# Patient Record
Sex: MALE | Race: WHITE | Employment: FULL TIME | URBAN - METROPOLITAN AREA
[De-identification: names, ages, dates, MRNs, and addresses within clinical notes are randomized per-mention and may not be internally consistent; named-entity substitution may affect disease eponyms.]

---

## 2020-08-26 ENCOUNTER — HOSPITAL ENCOUNTER (OUTPATIENT)
Dept: MRI IMAGING | Age: 50
Discharge: HOME | End: 2020-08-28
Payer: COMMERCIAL

## 2020-08-26 PROCEDURE — 73721 MRI JNT OF LWR EXTRE W/O DYE: CPT

## 2021-07-22 ENCOUNTER — APPOINTMENT (OUTPATIENT)
Dept: GENERAL RADIOLOGY | Age: 51
End: 2021-07-22
Payer: COMMERCIAL

## 2021-07-22 ENCOUNTER — HOSPITAL ENCOUNTER (EMERGENCY)
Age: 51
Discharge: HOME | End: 2021-07-22
Payer: COMMERCIAL

## 2021-07-22 VITALS
HEART RATE: 84 BPM | TEMPERATURE: 98.1 F | HEIGHT: 71 IN | WEIGHT: 285 LBS | SYSTOLIC BLOOD PRESSURE: 129 MMHG | DIASTOLIC BLOOD PRESSURE: 84 MMHG | RESPIRATION RATE: 18 BRPM | OXYGEN SATURATION: 96 % | BODY MASS INDEX: 39.9 KG/M2

## 2021-07-22 DIAGNOSIS — S93.601A SPRAIN OF RIGHT FOOT, INITIAL ENCOUNTER: Primary | ICD-10-CM

## 2021-07-22 PROCEDURE — 99283 EMERGENCY DEPT VISIT LOW MDM: CPT

## 2021-07-22 PROCEDURE — 73630 X-RAY EXAM OF FOOT: CPT

## 2021-07-22 ASSESSMENT — ENCOUNTER SYMPTOMS
BACK PAIN: 0
SHORTNESS OF BREATH: 0
ABDOMINAL PAIN: 0
COUGH: 0

## 2021-07-22 ASSESSMENT — PAIN DESCRIPTION - LOCATION: LOCATION: FOOT

## 2021-07-22 ASSESSMENT — PAIN SCALES - GENERAL: PAINLEVEL_OUTOF10: 6

## 2021-07-22 ASSESSMENT — PAIN DESCRIPTION - ORIENTATION: ORIENTATION: RIGHT

## 2021-07-22 ASSESSMENT — PAIN DESCRIPTION - FREQUENCY: FREQUENCY: CONTINUOUS

## 2021-07-22 ASSESSMENT — PAIN DESCRIPTION - PAIN TYPE: TYPE: ACUTE PAIN

## 2021-07-22 ASSESSMENT — PAIN DESCRIPTION - DESCRIPTORS: DESCRIPTORS: ACHING;SHARP

## 2021-07-22 NOTE — ED PROVIDER NOTES
[address] [facility] ED  eMERGENCY dEPARTMENT eNCOUnter      Pt Name: Kathy Mendoza  MRN: [id number]  [place] 1970  Date of evaluation: 7/22/2021  Provider: LEONARD Rodrigues CNP      HISTORY OF PRESENT ILLNESS    Kathy Mendoza is a 45 y.o. male who presents to the Emergency Department with R foot pain after stepping off the curb and inverting his foot. Pain is moderate. He is able to ambulate without difficulty. REVIEW OF SYSTEMS       Review of Systems   Constitutional: Negative for fever. HENT: Negative for congestion. Respiratory: Negative for cough and shortness of breath. Cardiovascular: Negative for chest pain. Gastrointestinal: Negative for abdominal pain. Genitourinary: Negative for dysuria. Musculoskeletal: Negative for arthralgias and back pain. R foot pain   Skin: Negative for rash. All other systems reviewed and are negative. PAST MEDICAL HISTORY     Past Medical History:   Diagnosis Date    Hypertension          SURGICAL HISTORY       Past Surgical History:   Procedure Laterality Date    FOOT SURGERY Left     SHOULDER SURGERY      left    VASECTOMY           CURRENT MEDICATIONS       Previous Medications    No medications on file       ALLERGIES     Corticosteroids    FAMILY HISTORY     History reviewed. No pertinent family history.        SOCIAL HISTORY       Social History     Socioeconomic History    Marital status:      Spouse name: None    Number of children: None    Years of education: None    Highest education level: None   Occupational History    None   Tobacco Use    Smoking status: Former Smoker    Smokeless tobacco: Never Used   Substance and Sexual Activity    Alcohol use: Not Currently    Drug use: Never    Sexual activity: None   Other Topics Concern    None   Social History Narrative    None     Social Determinants of Health     Financial Resource Strain:     Difficulty of Paying Living Expenses: Food Insecurity:     Worried About Running Out of Food in the Last Year:     [address] in the Last Year:    Transportation Needs:     Lack of Transportation (Medical):  Lack of Transportation (Non-Medical):    Physical Activity:     Days of Exercise per Week:     Minutes of Exercise per Session:    Stress:     Feeling of Stress :    Social Connections:     Frequency of Communication with Friends and Family:     Frequency of Social Gatherings with Friends and Family:     Attends [employer] Services:     Active Member of Clubs or Organizations:     Attends Club or Organization Meetings:     Marital Status:    Intimate Partner Violence:     Fear of Current or Ex-Partner:     Emotionally Abused:     Physically Abused:     Sexually Abused:        SCREENINGS      @FLOW([id number])@      PHYSICAL EXAM    (up to 7 for level 4, 8 or more for level 5)     ED Triage Vitals [07/22/21 1513]   BP Temp Temp src Pulse Resp SpO2 Height Weight   129/84 98.1 °F (36.7 °C) -- 84 18 96 % 5' 11\" (1.803 m) 285 lb (129.3 kg)       Physical Exam  Vitals and nursing note reviewed. Constitutional:       Appearance: He is well-developed. HENT:      Head: Normocephalic and atraumatic. Right Ear: External ear normal.      Left Ear: External ear normal.   Eyes:      Conjunctiva/sclera: Conjunctivae normal.      Pupils: Pupils are equal, round, and reactive to light. Cardiovascular:      Rate and Rhythm: Normal rate and regular rhythm. Pulmonary:      Effort: Pulmonary effort is normal.      Breath sounds: Normal breath sounds. Abdominal:      General: Bowel sounds are normal. There is no distension. Palpations: Abdomen is soft. Tenderness: There is no abdominal tenderness. Musculoskeletal:         General: Normal range of motion. Cervical back: Normal range of motion and neck supple. Right foot: Tenderness present. No swelling or deformity.         Feet:    Skin:     General: Skin is warm and dry. Neurological:      Mental Status: He is alert and oriented to person, place, and time. Deep Tendon Reflexes: Reflexes are normal and symmetric. Psychiatric:         Judgment: Judgment normal.           All other labs were within normal range or not returned as of this dictation. EMERGENCY DEPARTMENT COURSE and DIFFERENTIALDIAGNOSIS/MDM:   Vitals:    Vitals:    07/22/21 1513   BP: 129/84   Pulse: 84   Resp: 18   Temp: 98.1 °F (36.7 °C)   SpO2: 96%   Weight: 285 lb (129.3 kg)   Height: 5' 11\" (1.803 m)            45 yr old male with R foot sprain. Xray was negative. F/U with PCP in 1 week if symptoms persist.  Patient verbalizes understanding. PROCEDURES:  Unless otherwise noted below, none     Procedures      FINAL IMPRESSION      1.  Sprain of right foot, initial encounter          DISPOSITION/PLAN   DISPOSITION Decision To Discharge 07/22/2021 04:05:36 PM          LEONARD Hancock CNP (electronically signed)  Attending Emergency Physician     LEONARD Hancock CNP  07/22/21 3648

## 2021-07-22 NOTE — ED TRIAGE NOTES
Pt c/o rt side of foot pain d/t rolling the foot about hour ago. 0 deformity noted, 0 open wound. Pt to er ambulatory, a&ox4, skin w/d/pink, pulses palp, msp's intact, 0 other c/o.

## 2022-12-17 ENCOUNTER — HOSPITAL ENCOUNTER (EMERGENCY)
Age: 52
Discharge: HOME | End: 2022-12-17
Payer: COMMERCIAL

## 2022-12-17 ENCOUNTER — APPOINTMENT (OUTPATIENT)
Dept: CT IMAGING | Age: 52
End: 2022-12-17
Payer: COMMERCIAL

## 2022-12-17 VITALS
HEART RATE: 79 BPM | RESPIRATION RATE: 18 BRPM | OXYGEN SATURATION: 95 % | SYSTOLIC BLOOD PRESSURE: 148 MMHG | DIASTOLIC BLOOD PRESSURE: 91 MMHG | TEMPERATURE: 98.7 F

## 2022-12-17 DIAGNOSIS — R73.09 ELEVATED GLUCOSE: ICD-10-CM

## 2022-12-17 DIAGNOSIS — H61.22 IMPACTED CERUMEN OF LEFT EAR: ICD-10-CM

## 2022-12-17 DIAGNOSIS — H81.10 BENIGN PAROXYSMAL POSITIONAL VERTIGO, UNSPECIFIED LATERALITY: Primary | ICD-10-CM

## 2022-12-17 LAB
ALBUMIN SERPL-MCNC: 4.4 G/DL (ref 3.5–4.6)
ALP BLD-CCNC: 87 U/L (ref 35–104)
ALT SERPL-CCNC: 44 U/L (ref 0–41)
ANION GAP SERPL CALCULATED.3IONS-SCNC: 10 MEQ/L (ref 9–15)
APTT: 29 SEC (ref 24.4–36.8)
AST SERPL-CCNC: 23 U/L (ref 0–40)
BASOPHILS ABSOLUTE: 0 K/UL (ref 0–0.2)
BASOPHILS RELATIVE PERCENT: 0.4 %
BILIRUB SERPL-MCNC: 0.3 MG/DL (ref 0.2–0.7)
BILIRUBIN URINE: NEGATIVE
BLOOD, URINE: NEGATIVE
BUN BLDV-MCNC: 14 MG/DL (ref 6–20)
CALCIUM SERPL-MCNC: 9.1 MG/DL (ref 8.5–9.9)
CHLORIDE BLD-SCNC: 102 MEQ/L (ref 95–107)
CLARITY: CLEAR
CO2: 25 MEQ/L (ref 20–31)
COLOR: YELLOW
CREAT SERPL-MCNC: 0.66 MG/DL (ref 0.7–1.2)
EOSINOPHILS ABSOLUTE: 0.1 K/UL (ref 0–0.7)
EOSINOPHILS RELATIVE PERCENT: 1.4 %
GFR SERPL CREATININE-BSD FRML MDRD: >60 ML/MIN/{1.73_M2}
GLOBULIN: 2.3 G/DL (ref 2.3–3.5)
GLUCOSE BLD-MCNC: 132 MG/DL (ref 70–99)
GLUCOSE BLD-MCNC: 151 MG/DL (ref 70–99)
GLUCOSE URINE: NEGATIVE MG/DL
HCT VFR BLD CALC: 41.3 % (ref 42–52)
HEMOGLOBIN: 14.2 G/DL (ref 14–18)
INR BLD: 1
KETONES, URINE: NEGATIVE MG/DL
LEUKOCYTE ESTERASE, URINE: NEGATIVE
LYMPHOCYTES ABSOLUTE: 1.5 K/UL (ref 1–4.8)
LYMPHOCYTES RELATIVE PERCENT: 17 %
MAGNESIUM: 1.7 MG/DL (ref 1.7–2.4)
MCH RBC QN AUTO: 31.8 PG (ref 27–31.3)
MCHC RBC AUTO-ENTMCNC: 34.4 % (ref 33–37)
MCV RBC AUTO: 92.5 FL (ref 79–92.2)
MONOCYTES ABSOLUTE: 0.3 K/UL (ref 0.2–0.8)
MONOCYTES RELATIVE PERCENT: 4 %
NEUTROPHILS ABSOLUTE: 6.7 K/UL (ref 1.4–6.5)
NEUTROPHILS RELATIVE PERCENT: 77.2 %
NITRITE, URINE: NEGATIVE
PDW BLD-RTO: 12.8 % (ref 11.5–14.5)
PERFORMED ON: ABNORMAL
PH UA: 5 (ref 5–9)
PLATELET # BLD: 191 K/UL (ref 130–400)
POTASSIUM SERPL-SCNC: 4.3 MEQ/L (ref 3.4–4.9)
PROTEIN UA: NEGATIVE MG/DL
PROTHROMBIN TIME: 13 SEC (ref 12.3–14.9)
RBC # BLD: 4.46 M/UL (ref 4.7–6.1)
SODIUM BLD-SCNC: 137 MEQ/L (ref 135–144)
SPECIFIC GRAVITY UA: 1.02 (ref 1–1.03)
TOTAL PROTEIN: 6.7 G/DL (ref 6.3–8)
TROPONIN: <0.01 NG/ML (ref 0–0.01)
TSH SERPL DL<=0.05 MIU/L-ACNC: 0.97 UIU/ML (ref 0.44–3.86)
UROBILINOGEN, URINE: 0.2 E.U./DL
WBC # BLD: 8.7 K/UL (ref 4.8–10.8)

## 2022-12-17 PROCEDURE — 85610 PROTHROMBIN TIME: CPT

## 2022-12-17 PROCEDURE — 84443 ASSAY THYROID STIM HORMONE: CPT

## 2022-12-17 PROCEDURE — 70450 CT HEAD/BRAIN W/O DYE: CPT

## 2022-12-17 PROCEDURE — 36415 COLL VENOUS BLD VENIPUNCTURE: CPT

## 2022-12-17 PROCEDURE — 93005 ELECTROCARDIOGRAM TRACING: CPT | Performed by: PHYSICIAN ASSISTANT

## 2022-12-17 PROCEDURE — 6370000000 HC RX 637 (ALT 250 FOR IP): Performed by: PHYSICIAN ASSISTANT

## 2022-12-17 PROCEDURE — 85025 COMPLETE CBC W/AUTO DIFF WBC: CPT

## 2022-12-17 PROCEDURE — 80053 COMPREHEN METABOLIC PANEL: CPT

## 2022-12-17 PROCEDURE — 99284 EMERGENCY DEPT VISIT MOD MDM: CPT

## 2022-12-17 PROCEDURE — 81003 URINALYSIS AUTO W/O SCOPE: CPT

## 2022-12-17 PROCEDURE — 84484 ASSAY OF TROPONIN QUANT: CPT

## 2022-12-17 PROCEDURE — 85730 THROMBOPLASTIN TIME PARTIAL: CPT

## 2022-12-17 PROCEDURE — 83735 ASSAY OF MAGNESIUM: CPT

## 2022-12-17 RX ORDER — MECLIZINE HYDROCHLORIDE 25 MG/1
25 TABLET ORAL 3 TIMES DAILY PRN
Qty: 20 TABLET | Refills: 0 | Status: SHIPPED | OUTPATIENT
Start: 2022-12-17 | End: 2022-12-27

## 2022-12-17 RX ORDER — MECLIZINE HYDROCHLORIDE 25 MG/1
25 TABLET ORAL ONCE
Status: COMPLETED | OUTPATIENT
Start: 2022-12-17 | End: 2022-12-17

## 2022-12-17 RX ADMIN — MECLIZINE HYDROCHLORIDE 25 MG: 25 TABLET ORAL at 15:28

## 2022-12-17 ASSESSMENT — ENCOUNTER SYMPTOMS
COUGH: 0
APNEA: 0
PHOTOPHOBIA: 0
ABDOMINAL DISTENTION: 0
VOMITING: 0
VOICE CHANGE: 0
EYE DISCHARGE: 0
NAUSEA: 0
ANAL BLEEDING: 0

## 2022-12-17 ASSESSMENT — PAIN - FUNCTIONAL ASSESSMENT: PAIN_FUNCTIONAL_ASSESSMENT: NONE - DENIES PAIN

## 2022-12-17 NOTE — ED TRIAGE NOTES
To ED after a sudden spell of dizziness and diaphoresis. States he was feeling fine, working when he suddenly became dizzy and sweaty.

## 2022-12-17 NOTE — ED PROVIDER NOTES
[address] [facility] ED  eMERGENCY dEPARTMENT eNCOUnter      Pt Name: Guillermo Dunn  MRN: [id number]  [place] 1970  Date of evaluation: 12/17/2022  Provider: Saumya Castillo PA-C    CHIEF COMPLAINT       Chief Complaint   Patient presents with    Dizziness         HISTORY OF PRESENT ILLNESS   (Location/Symptom, Timing/Onset,Context/Setting, Quality, Duration, Modifying Factors, Severity)  Note limiting factors. Guillermo Dunn is a 47 y.o. 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 Has not had similar symptoms in the past.  He denies headache nausea vomiting chest pain blurred vision weakness denies a abdominal pain urinary symptom clinic pain burning frequent urination urinary bleeding rectal bleeding hematemesis. Motion worsens symptoms not moving improves his symptoms    HPI    NursingNotes were reviewed. REVIEW OF SYSTEMS    (2-9 systems for level 4, 10 or more for level 5)     Review of Systems   Constitutional:  Negative for activity change, appetite change and unexpected weight change. HENT:  Positive for ear pain and hearing loss. Negative for ear discharge, nosebleeds and voice change. Eyes:  Negative for photophobia and discharge. Respiratory:  Negative for apnea and cough. Cardiovascular:  Negative for chest pain. Gastrointestinal:  Negative for abdominal distention, anal bleeding, nausea and vomiting. Genitourinary:  Negative for dysuria and hematuria. Musculoskeletal:  Negative for joint swelling. Skin:  Negative for pallor. Neurological:  Positive for dizziness. Negative for seizures, syncope, facial asymmetry, weakness and headaches. Hematological:  Does not bruise/bleed easily.    Psychiatric/Behavioral: Negative for self-injury. All other systems reviewed and are negative. Except as noted above the remainder of the review of systems was reviewed and negative. PAST MEDICAL HISTORY     Past Medical History:   Diagnosis Date    Hypertension          SURGICALHISTORY       Past Surgical History:   Procedure Laterality Date    FOOT SURGERY Left     SHOULDER SURGERY      left    VASECTOMY           CURRENT MEDICATIONS       Previous Medications    No medications on file            Corticosteroids    FAMILY HISTORY     History reviewed. No pertinent family history. SOCIAL HISTORY       Social History     Socioeconomic History    Marital status:      Spouse name: None    Number of children: None    Years of education: None    Highest education level: None   Tobacco Use    Smoking status: Former    Smokeless tobacco: Never   Substance and Sexual Activity    Alcohol use: Not Currently    Drug use: Never       SCREENINGS   NIHSS Stroke Scale  Interval: Baseline  Level of Consciousness (1a): Alert  LOC Questions (1b): Answers both correctly  LOC Commands (1c): Performs both tasks correctly  Best Gaze (2): Normal  Visual (3): No visual loss  Facial Palsy (4): Normal symmetrical movement  Motor Arm, Left (5a): No drift  Motor Arm, Right (5b): No drift  Motor Leg, Left (6a): No drift  Motor Leg, Right (6b):  No drift  Limb Ataxia (7): Absent  Sensory (8): Normal  Best Language (9): No aphasia  Dysarthria (10): Normal  Extinction and Inattention (11): No abnormality  Total: 0  Nissa Coma Scale  Eye Opening: Spontaneous  Best Verbal Response: Oriented  Best Motor Response: Obeys commands  Victor Coma Scale Score: 15  Ebola Virus Disease (EVD) Screening   Temp: 98.7 °F (37.1 °C)  CIWA Assessment  BP: (!) 148/91  Heart Rate: 79    PHYSICAL EXAM    (up to 7 for level 4, 8 or more for level 5)     ED Triage Vitals [12/17/22 1359]   BP Temp Temp Source Heart Rate Resp SpO2 Height Weight   (!) 148/91 98.7 °F (37.1 °C) Oral 79 18 95 % -- --       Physical Exam  Vitals and nursing note reviewed. Constitutional:       General: He is not in acute distress. Appearance: He is well-developed. HENT:      Head: Normocephalic and atraumatic. Right Ear: Tympanic membrane and external ear normal.      Ears:      Comments: Cerumen impaction     Nose: Nose normal.      Mouth/Throat:      Mouth: Mucous membranes are moist.   Eyes:      General:         Right eye: No discharge. Left eye: No discharge. Pupils: Pupils are equal, round, and reactive to light. Cardiovascular:      Rate and Rhythm: Normal rate and regular rhythm. Pulses: Normal pulses. Heart sounds: Normal heart sounds. Pulmonary:      Effort: Pulmonary effort is normal. No respiratory distress. Breath sounds: Normal breath sounds. No stridor. Abdominal:      General: Bowel sounds are normal. There is no distension. Palpations: Abdomen is soft. Musculoskeletal:         General: Normal range of motion. Cervical back: Normal range of motion and neck supple. Right lower leg: Edema present. Left lower leg: Edema present. Skin:     General: Skin is warm. Capillary Refill: Capillary refill takes less than 2 seconds. Findings: No erythema. Neurological:      Mental Status: He is alert and oriented to person, place, and time. Sensory: No sensory deficit. Motor: No weakness. Coordination: Coordination normal.   Psychiatric:         Mood and Affect: Mood normal.       RESULTS     EKG: All EKG's are interpreted by the Emergency Department Physician who either signs or Co-signsthis chart in the absence of a cardiologist.    EKG normal sinus rhythm rate 75 AR interval is 138 ms  ms  ms negative ST segment ovation.     RADIOLOGY:   Non-plain filmimages such as CT, Ultrasound and MRI are read by the radiologist. Plain radiographic images are visualized and preliminarily interpreted by the emergency physician with the below findings:         Interpretation per the Radiologist below, if available at the time ofthis note:    CT Head W/O Contrast   Final Result   1. No acute intracranial abnormality. 2.  Mild, diffuse, cerebral and cerebellar atrophy. 3.  Mild chronic ethmoid sinusitis and minimal chronic sphenoid sinusitis. ED BEDSIDE ULTRASOUND:   Performed by ED Physician - none    LABS:  Labs Reviewed   CBC WITH AUTO DIFFERENTIAL - Abnormal; Notable for the following components:       Result Value    RBC 4.46 (*)     Hematocrit 41.3 (*)     MCV 92.5 (*)     MCH 31.8 (*)     Neutrophils Absolute 6.7 (*)     All other components within normal limits   COMPREHENSIVE METABOLIC PANEL - Abnormal; Notable for the following components:    Glucose 151 (*)     Creatinine 0.66 (*)     ALT 44 (*)     All other components within normal limits   POCT GLUCOSE - Abnormal; Notable for the following components:    POC Glucose 132 (*)     All other components within normal limits   MAGNESIUM   TROPONIN   TSH   URINALYSIS   APTT   PROTIME-INR       All other labs were within normal range or not returned as of this dictation.     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 Left room spinning dizziness ear fullness we will add Antivert for possible vertigo however with diaphoresis and continued symptoms we will add CAT scan head noncontrast NIH stroke scale currently 0. Discussed with Dr. Olga Lidia Baltazar the ER attending will add basic lab work as well as EKG. Patient denies any blood thinner use he does take blood pressure medications x3 his current blood pressure 352 mmHg systolic within normal limits no medications indicated. I reviewed EKG normal sinus negative ST segment elevation rate 75. I reviewed CT films I await report I reviewed patient's outpatient visits with primary care doctor including his history and medications he does take 3 blood pressure medication including metoprolol amlodipine. He does have history of prediabetes we have discussed laboratory work-up CT scan he has had improvement with Antivert we will add Antivert to his medications for home we discussed not driving or operate equipment if there is any dizziness or lightheadedness. We also discussed not climbing ladders or roofs. We discussed cerumen impaction no sharp objects and areas no drainage at this time. He is to follow-up primary care return to if any symptoms worsen or new symptoms develop       Amount and/or Complexity of Data Reviewed  Clinical lab tests: reviewed and ordered  Tests in the radiology section of CPT®: ordered  Discuss the patient with other providers: yes        CRITICAL CARE TIME   Total Critical Care time was   minutes, excluding separately reportableprocedures. There was a high probability of clinicallysignificant/life threatening deterioration in the patient's condition which required my urgent intervention. CONSULTS:  None    PROCEDURES:  Unless otherwise noted below, none     Procedures    FINAL IMPRESSION      1.  Benign paroxysmal positional vertigo, unspecified laterality    2. Impacted cerumen of left ear    3.  Elevated glucose          DISPOSITION/PLAN   DISPOSITION Decision To Discharge 12/17/2022 04:58:52 PM      PATIENT REFERRED TO:  Janna Barr MD  [facility] [address] [address] [phone]    Call in 1 day      Russell Kolb MD  [address] [facility] [address] [facility] [address]  [phone]    Call in 1 day      [facility]) ED  [address] [facility]  [phone]  Go to   If symptoms worsen    DISCHARGE MEDICATIONS:  New Prescriptions    MECLIZINE (ANTIVERT) 25 MG TABLET    Take 1 tablet by mouth 3 times daily as needed for Dizziness          (Please note that portions of this note were completed with a voice recognition program.  Efforts were made to edit the dictations but occasionally words are mis-transcribed.)    Ferny Solis PA-C (electronically signed)  Attending Emergency Physician        Ferny Solis PA-C  12/17/22 9180

## 2022-12-19 LAB
EKG ATRIAL RATE: 75 BPM
EKG P AXIS: 40 DEGREES
EKG P-R INTERVAL: 138 MS
EKG Q-T INTERVAL: 420 MS
EKG QRS DURATION: 112 MS
EKG QTC CALCULATION (BAZETT): 469 MS
EKG R AXIS: -19 DEGREES
EKG T AXIS: 22 DEGREES
EKG VENTRICULAR RATE: 75 BPM

## 2022-12-19 PROCEDURE — 93010 ELECTROCARDIOGRAM REPORT: CPT | Performed by: INTERNAL MEDICINE

## 2023-04-04 ENCOUNTER — TELEPHONE (OUTPATIENT)
Dept: PRIMARY CARE | Facility: CLINIC | Age: 53
End: 2023-04-04
Payer: COMMERCIAL

## 2023-04-04 DIAGNOSIS — J45.30 MILD PERSISTENT ASTHMA, UNSPECIFIED WHETHER COMPLICATED (HHS-HCC): ICD-10-CM

## 2023-04-04 PROBLEM — I87.8 VENOUS STASIS: Status: ACTIVE | Noted: 2023-04-04

## 2023-04-04 PROBLEM — K57.90 DIVERTICULOSIS: Status: ACTIVE | Noted: 2023-04-04

## 2023-04-04 PROBLEM — N52.9 MALE ERECTILE DISORDER: Status: ACTIVE | Noted: 2023-04-04

## 2023-04-04 PROBLEM — N40.0 BENIGN PROSTATIC HYPERPLASIA: Status: ACTIVE | Noted: 2023-04-04

## 2023-04-04 PROBLEM — E78.5 HLD (HYPERLIPIDEMIA): Status: ACTIVE | Noted: 2023-04-04

## 2023-04-04 PROBLEM — M17.12 PRIMARY LOCALIZED OSTEOARTHRITIS OF LEFT KNEE: Status: ACTIVE | Noted: 2023-04-04

## 2023-04-04 PROBLEM — J45.909 ASTHMA (HHS-HCC): Status: ACTIVE | Noted: 2023-04-04

## 2023-04-04 PROBLEM — R73.03 PREDIABETES: Status: ACTIVE | Noted: 2023-04-04

## 2023-04-04 PROBLEM — K58.9 IRRITABLE BOWEL SYNDROME: Status: ACTIVE | Noted: 2023-04-04

## 2023-04-04 PROBLEM — I10 HTN (HYPERTENSION): Status: ACTIVE | Noted: 2023-04-04

## 2023-04-04 PROBLEM — K76.0 NONALCOHOLIC FATTY LIVER DISEASE: Status: ACTIVE | Noted: 2023-04-04

## 2023-04-04 PROBLEM — M67.90 TENDON NODULE: Status: ACTIVE | Noted: 2023-04-04

## 2023-04-04 PROBLEM — G47.30 SLEEP APNEA: Status: ACTIVE | Noted: 2023-04-04

## 2023-04-04 PROBLEM — F41.9 ANXIETY DISORDER: Status: ACTIVE | Noted: 2023-04-04

## 2023-04-04 PROBLEM — K27.9 PUD (PEPTIC ULCER DISEASE): Status: ACTIVE | Noted: 2023-04-04

## 2023-04-04 PROBLEM — E66.01 MORBID OBESITY (MULTI): Status: ACTIVE | Noted: 2023-04-04

## 2023-04-04 RX ORDER — TRAZODONE HYDROCHLORIDE 50 MG/1
1 TABLET ORAL DAILY
COMMUNITY
End: 2023-04-28 | Stop reason: SDUPTHER

## 2023-04-04 RX ORDER — TAMSULOSIN HYDROCHLORIDE 0.4 MG/1
1 CAPSULE ORAL DAILY
COMMUNITY
Start: 2021-03-01 | End: 2024-05-06 | Stop reason: SDUPTHER

## 2023-04-04 RX ORDER — ACETAMINOPHEN 500 MG
2 TABLET ORAL 2 TIMES DAILY
COMMUNITY

## 2023-04-04 RX ORDER — MECLIZINE HYDROCHLORIDE 25 MG/1
25 TABLET ORAL 3 TIMES DAILY PRN
COMMUNITY
Start: 2022-12-17 | End: 2024-05-06 | Stop reason: SDUPTHER

## 2023-04-04 RX ORDER — LISINOPRIL 40 MG/1
1 TABLET ORAL DAILY
COMMUNITY
Start: 2018-06-11 | End: 2023-05-08 | Stop reason: SDUPTHER

## 2023-04-04 RX ORDER — GLUCOSAM/CHONDRO/HERB 149/HYAL 750-100 MG
1 TABLET ORAL DAILY
COMMUNITY
Start: 2019-09-04

## 2023-04-04 RX ORDER — BLOOD-GLUCOSE METER
1 EACH MISCELLANEOUS 3 TIMES DAILY
COMMUNITY
Start: 2022-12-26

## 2023-04-04 RX ORDER — HYDROGEN PEROXIDE 3 %
1 SOLUTION, NON-ORAL MISCELLANEOUS DAILY
COMMUNITY
End: 2023-04-28 | Stop reason: SDUPTHER

## 2023-04-04 RX ORDER — MONTELUKAST SODIUM 10 MG/1
10 TABLET ORAL NIGHTLY
COMMUNITY
End: 2023-04-04 | Stop reason: SDUPTHER

## 2023-04-04 RX ORDER — ALBUTEROL SULFATE 90 UG/1
2 AEROSOL, METERED RESPIRATORY (INHALATION) EVERY 6 HOURS PRN
Qty: 18 G | Refills: 3 | Status: SHIPPED | OUTPATIENT
Start: 2023-04-04

## 2023-04-04 RX ORDER — ASPIRIN 81 MG/1
1 TABLET ORAL DAILY
COMMUNITY

## 2023-04-04 RX ORDER — ROSUVASTATIN CALCIUM 5 MG/1
1 TABLET, COATED ORAL DAILY
COMMUNITY
End: 2023-04-28 | Stop reason: SDUPTHER

## 2023-04-04 RX ORDER — METOPROLOL SUCCINATE 100 MG/1
1 TABLET, EXTENDED RELEASE ORAL DAILY
COMMUNITY
End: 2023-04-28 | Stop reason: SDUPTHER

## 2023-04-04 RX ORDER — LANCETS
1 EACH MISCELLANEOUS 3 TIMES DAILY
COMMUNITY
Start: 2022-12-22

## 2023-04-04 RX ORDER — CITALOPRAM 20 MG/1
1 TABLET, FILM COATED ORAL DAILY
COMMUNITY
End: 2023-04-28 | Stop reason: SDUPTHER

## 2023-04-04 RX ORDER — FUROSEMIDE 20 MG/1
1 TABLET ORAL DAILY
COMMUNITY
Start: 2019-02-06 | End: 2024-05-06 | Stop reason: SDUPTHER

## 2023-04-04 RX ORDER — ALBUTEROL SULFATE 90 UG/1
2 AEROSOL, METERED RESPIRATORY (INHALATION) EVERY 6 HOURS PRN
COMMUNITY
End: 2023-04-04 | Stop reason: SDUPTHER

## 2023-04-04 RX ORDER — AMLODIPINE BESYLATE 5 MG/1
1 TABLET ORAL DAILY
COMMUNITY
Start: 2021-03-01 | End: 2023-04-14 | Stop reason: DRUGHIGH

## 2023-04-04 RX ORDER — FEXOFENADINE HCL AND PSEUDOEPHEDRINE HCI 60; 120 MG/1; MG/1
1 TABLET, EXTENDED RELEASE ORAL 2 TIMES DAILY
COMMUNITY
Start: 2021-05-25 | End: 2023-04-14 | Stop reason: ALTCHOICE

## 2023-04-04 RX ORDER — MONTELUKAST SODIUM 10 MG/1
10 TABLET ORAL NIGHTLY
Qty: 30 TABLET | Refills: 11 | Status: SHIPPED | OUTPATIENT
Start: 2023-04-04 | End: 2023-05-08 | Stop reason: SDUPTHER

## 2023-04-14 ENCOUNTER — OFFICE VISIT (OUTPATIENT)
Dept: PRIMARY CARE | Facility: CLINIC | Age: 53
End: 2023-04-14
Payer: COMMERCIAL

## 2023-04-14 VITALS
HEIGHT: 71 IN | WEIGHT: 299 LBS | HEART RATE: 70 BPM | DIASTOLIC BLOOD PRESSURE: 75 MMHG | SYSTOLIC BLOOD PRESSURE: 129 MMHG | TEMPERATURE: 98.4 F | BODY MASS INDEX: 41.86 KG/M2 | RESPIRATION RATE: 18 BRPM

## 2023-04-14 DIAGNOSIS — I10 HYPERTENSION, UNSPECIFIED TYPE: ICD-10-CM

## 2023-04-14 DIAGNOSIS — G47.33 OBSTRUCTIVE SLEEP APNEA SYNDROME: ICD-10-CM

## 2023-04-14 DIAGNOSIS — J30.2 SEASONAL ALLERGIES: ICD-10-CM

## 2023-04-14 DIAGNOSIS — R73.03 PREDIABETES: Primary | ICD-10-CM

## 2023-04-14 PROBLEM — E66.813 CLASS 3 SEVERE OBESITY DUE TO EXCESS CALORIES WITH BODY MASS INDEX (BMI) OF 40.0 TO 44.9 IN ADULT: Status: ACTIVE | Noted: 2023-04-04

## 2023-04-14 PROCEDURE — 3078F DIAST BP <80 MM HG: CPT | Performed by: FAMILY MEDICINE

## 2023-04-14 PROCEDURE — 99214 OFFICE O/P EST MOD 30 MIN: CPT | Performed by: FAMILY MEDICINE

## 2023-04-14 PROCEDURE — 3074F SYST BP LT 130 MM HG: CPT | Performed by: FAMILY MEDICINE

## 2023-04-14 PROCEDURE — 1036F TOBACCO NON-USER: CPT | Performed by: FAMILY MEDICINE

## 2023-04-14 RX ORDER — FEXOFENADINE HCL AND PSEUDOEPHEDRINE HCI 180; 240 MG/1; MG/1
1 TABLET, EXTENDED RELEASE ORAL DAILY PRN
Qty: 30 TABLET | Refills: 5 | Status: SHIPPED | OUTPATIENT
Start: 2023-04-14 | End: 2024-04-13

## 2023-04-14 RX ORDER — AMLODIPINE BESYLATE 2.5 MG/1
5 TABLET ORAL DAILY
Qty: 90 TABLET | Refills: 3 | Status: SHIPPED | OUTPATIENT
Start: 2023-04-14 | End: 2023-05-08 | Stop reason: SDUPTHER

## 2023-04-14 ASSESSMENT — ENCOUNTER SYMPTOMS
SPUTUM PRODUCTION: 1
WHEEZING: 1
SHORTNESS OF BREATH: 1

## 2023-04-14 NOTE — ASSESSMENT & PLAN NOTE
He has been monitoring his blood sugar at home and is not reporting any elevated blood sugars making this less likely to be the etiology.  We will certainly want to check an A1c at his next blood draw.

## 2023-04-14 NOTE — ASSESSMENT & PLAN NOTE
He originally thought that the dyspnea sensation was due to asthma but there is no coughing, no coughing on exertion, and really no dyspnea on exertion.  His lung exam is completely normal without wheezes or rails.  I do not think that a dose adjustment is indicated.  We will continue to treat this with a simple as needed albuterol.  He quit smoking a few years ago which probably mostly resolved the asthma

## 2023-04-14 NOTE — PROGRESS NOTES
Subjective   Pavel Verdugo is a 52 y.o. male who presents for Asthma.  Asthma  He complains of shortness of breath, sputum production and wheezing. This is a chronic problem. The problem has been gradually worsening. His past medical history is significant for asthma.     Review of Systems   Respiratory:  Positive for sputum production, shortness of breath and wheezing.      Visit Vitals  /75   Pulse 70   Temp 36.9 °C (98.4 °F)   Resp 18      Objective   Physical Exam  Constitutional:       Appearance: Normal appearance.   HENT:      Head: Normocephalic.   Eyes:      Conjunctiva/sclera: Conjunctivae normal.   Cardiovascular:      Rate and Rhythm: Normal rate and regular rhythm.   Pulmonary:      Effort: Pulmonary effort is normal.      Breath sounds: Normal breath sounds.   Musculoskeletal:      Cervical back: Neck supple.   Skin:     General: Skin is warm and dry.   Neurological:      Mental Status: He is alert.         Assessment/Plan    Problem List Items Addressed This Visit       Sleep apnea     He never got CPAP treatment and now many years have gone by so we will begin again with a sleep study to determine the severity and then reccoment treatment options         Relevant Orders    Home sleep apnea test (HSAT)    Follow Up In [facility] - PCP    HTN (hypertension)     Blood pressure is therapeutic today he is reporting spells of dizziness and feeling his heart flip like he is on a roller coaster.  This does not really sound like lung or cardiac etiology but may be low blood pressure spells.  We will give him a trial of dose reduction on his Norvasc from 5 mg down to 2.5 mg daily.  He will monitor his blood pressure with his home cuff and let me know if the numbers rises or if his symptoms resolved.         Prediabetes - Primary     He has been monitoring his blood sugar at home and is not reporting any elevated blood sugars making this less likely to be the etiology.  We will certainly want  to check an A1c at his next blood draw.         Seasonal allergies     He is well managed with Allegra-D which I will refill today         Relevant Medications    fexofenadine-pseudoephedrine (Allegra-D 24) 180-240 mg 24 hr tablet

## 2023-04-14 NOTE — ASSESSMENT & PLAN NOTE
Blood pressure is therapeutic today he is reporting spells of dizziness and feeling his heart flip like he is on a roller coaster.  This does not really sound like lung or cardiac etiology but may be low blood pressure spells.  We will give him a trial of dose reduction on his Norvasc from 5 mg down to 2.5 mg daily.  He will monitor his blood pressure with his home cuff and let me know if the numbers rises or if his symptoms resolved.

## 2023-04-14 NOTE — ASSESSMENT & PLAN NOTE
He never got CPAP treatment and now many years have gone by so we will begin again with a sleep study to determine the severity and then reccoment treatment options

## 2023-04-24 DIAGNOSIS — G47.33 OBSTRUCTIVE SLEEP APNEA SYNDROME: ICD-10-CM

## 2023-04-28 DIAGNOSIS — F41.9 ANXIETY DISORDER, UNSPECIFIED TYPE: ICD-10-CM

## 2023-04-28 DIAGNOSIS — K27.9 PUD (PEPTIC ULCER DISEASE): ICD-10-CM

## 2023-04-28 DIAGNOSIS — E78.5 HYPERLIPIDEMIA, UNSPECIFIED HYPERLIPIDEMIA TYPE: ICD-10-CM

## 2023-04-28 DIAGNOSIS — I10 HYPERTENSION, UNSPECIFIED TYPE: ICD-10-CM

## 2023-04-28 RX ORDER — ROSUVASTATIN CALCIUM 5 MG/1
5 TABLET, COATED ORAL DAILY
Qty: 90 TABLET | Refills: 3 | Status: SHIPPED | OUTPATIENT
Start: 2023-04-28 | End: 2024-05-06 | Stop reason: SDUPTHER

## 2023-04-28 RX ORDER — HYDROGEN PEROXIDE 3 %
20 SOLUTION, NON-ORAL MISCELLANEOUS DAILY
Qty: 90 CAPSULE | Refills: 3 | Status: SHIPPED | OUTPATIENT
Start: 2023-04-28

## 2023-04-28 RX ORDER — CITALOPRAM 20 MG/1
20 TABLET, FILM COATED ORAL DAILY
Qty: 90 TABLET | Refills: 3 | Status: SHIPPED | OUTPATIENT
Start: 2023-04-28 | End: 2024-05-06 | Stop reason: SDUPTHER

## 2023-04-28 RX ORDER — METOPROLOL SUCCINATE 100 MG/1
100 TABLET, EXTENDED RELEASE ORAL DAILY
Qty: 90 TABLET | Refills: 3 | Status: SHIPPED | OUTPATIENT
Start: 2023-04-28 | End: 2024-03-07 | Stop reason: SDUPTHER

## 2023-04-28 RX ORDER — TRAZODONE HYDROCHLORIDE 50 MG/1
50 TABLET ORAL DAILY
Qty: 90 TABLET | Refills: 3 | Status: SHIPPED | OUTPATIENT
Start: 2023-04-28

## 2023-05-08 DIAGNOSIS — I10 HYPERTENSION, UNSPECIFIED TYPE: ICD-10-CM

## 2023-05-08 DIAGNOSIS — J45.30 MILD PERSISTENT ASTHMA, UNSPECIFIED WHETHER COMPLICATED (HHS-HCC): ICD-10-CM

## 2023-05-08 RX ORDER — AMLODIPINE BESYLATE 2.5 MG/1
5 TABLET ORAL DAILY
Qty: 90 TABLET | Refills: 3 | Status: SHIPPED
Start: 2023-05-08 | End: 2023-05-11 | Stop reason: SDUPTHER

## 2023-05-08 RX ORDER — LISINOPRIL 40 MG/1
40 TABLET ORAL DAILY
Qty: 90 TABLET | Refills: 3 | Status: SHIPPED | OUTPATIENT
Start: 2023-05-08 | End: 2024-05-06 | Stop reason: SDUPTHER

## 2023-05-08 RX ORDER — MONTELUKAST SODIUM 10 MG/1
10 TABLET ORAL NIGHTLY
Qty: 90 TABLET | Refills: 3 | Status: SHIPPED | OUTPATIENT
Start: 2023-05-08 | End: 2024-05-06 | Stop reason: SDUPTHER

## 2023-05-11 DIAGNOSIS — I10 HYPERTENSION, UNSPECIFIED TYPE: ICD-10-CM

## 2023-05-11 RX ORDER — AMLODIPINE BESYLATE 5 MG/1
5 TABLET ORAL DAILY
Qty: 90 TABLET | Refills: 3 | Status: SHIPPED | OUTPATIENT
Start: 2023-05-11 | End: 2024-05-06 | Stop reason: SDUPTHER

## 2023-05-12 ENCOUNTER — OFFICE VISIT (OUTPATIENT)
Dept: PRIMARY CARE | Facility: CLINIC | Age: 53
End: 2023-05-12
Payer: COMMERCIAL

## 2023-05-12 VITALS
WEIGHT: 295 LBS | HEART RATE: 78 BPM | HEIGHT: 71 IN | DIASTOLIC BLOOD PRESSURE: 78 MMHG | RESPIRATION RATE: 16 BRPM | SYSTOLIC BLOOD PRESSURE: 119 MMHG | BODY MASS INDEX: 41.3 KG/M2 | TEMPERATURE: 98.3 F

## 2023-05-12 DIAGNOSIS — I10 PRIMARY HYPERTENSION: Primary | ICD-10-CM

## 2023-05-12 DIAGNOSIS — G47.33 OBSTRUCTIVE SLEEP APNEA SYNDROME: ICD-10-CM

## 2023-05-12 DIAGNOSIS — E66.01 CLASS 3 SEVERE OBESITY DUE TO EXCESS CALORIES WITH SERIOUS COMORBIDITY AND BODY MASS INDEX (BMI) OF 40.0 TO 44.9 IN ADULT (MULTI): ICD-10-CM

## 2023-05-12 PROBLEM — K27.9 PEPTIC ULCER: Status: ACTIVE | Noted: 2020-07-01

## 2023-05-12 PROBLEM — K57.90 DIVERTICULAR DISEASE: Status: ACTIVE | Noted: 2020-07-01

## 2023-05-12 PROBLEM — E78.5 HYPERLIPIDEMIA: Status: ACTIVE | Noted: 2020-07-01

## 2023-05-12 PROBLEM — R73.03 PREDIABETES: Status: ACTIVE | Noted: 2020-07-01

## 2023-05-12 PROBLEM — J45.909 ASTHMA (HHS-HCC): Status: ACTIVE | Noted: 2020-07-01

## 2023-05-12 PROBLEM — M17.9 OSTEOARTHRITIS OF KNEE: Status: ACTIVE | Noted: 2020-07-01

## 2023-05-12 PROCEDURE — 99214 OFFICE O/P EST MOD 30 MIN: CPT | Performed by: FAMILY MEDICINE

## 2023-05-12 PROCEDURE — 1036F TOBACCO NON-USER: CPT | Performed by: FAMILY MEDICINE

## 2023-05-12 PROCEDURE — 3074F SYST BP LT 130 MM HG: CPT | Performed by: FAMILY MEDICINE

## 2023-05-12 PROCEDURE — 3008F BODY MASS INDEX DOCD: CPT | Performed by: FAMILY MEDICINE

## 2023-05-12 PROCEDURE — 3078F DIAST BP <80 MM HG: CPT | Performed by: FAMILY MEDICINE

## 2023-05-12 RX ORDER — TIRZEPATIDE 2.5 MG/.5ML
2.5 INJECTION, SOLUTION SUBCUTANEOUS
Qty: 2 ML | Refills: 0 | Status: SHIPPED | OUTPATIENT
Start: 2023-05-12 | End: 2023-05-19

## 2023-05-12 NOTE — ASSESSMENT & PLAN NOTE
Interval BP readings after stopping the norvasc are mostly at goal so we will continue off the norvasc

## 2023-05-12 NOTE — ASSESSMENT & PLAN NOTE
This is a longstanding problem that is frustrating for this 52-year-old male who has failed many attempts at weight loss.  It is now severely contributing to his sleep apnea which is a serious underlying medical condition.  We discussed treatment options and he is an excellent candidate for Mounjaro.  He has been diagnosed with prediabetes as well which increases the benefit of Mounjaro.  We discussed the technique for titrating this medication up over the next 4 months and we will put in a prescription for this today.

## 2023-05-12 NOTE — PROGRESS NOTES
Subjective   Pavel Verdugo is a 52 y.o. male who presents for Sleep Apnea.  HPI  He is here to discuss the results of his recent sleep study.  Visit Vitals  /78   Pulse 78   Temp 36.8 °C (98.3 °F)   Resp 16      Objective   Physical Exam  Constitutional:       Appearance: Normal appearance.   HENT:      Head: Normocephalic.   Pulmonary:      Effort: Pulmonary effort is normal.   Musculoskeletal:      Cervical back: Neck supple.      Right lower leg: No edema.      Left lower leg: No edema.   Skin:     General: Skin is warm and dry.   Psychiatric:         Mood and Affect: Mood normal.         Assessment/Plan    Problem List Items Addressed This Visit       Obstructive sleep apnea syndrome     We reviewed the sleep study extensively showing severe obstructive sleep apnea with an AHI of almost 90 in the supine position.  We discussed the extreme benefit of treatment for sleep apnea to improve sleep, improve energy, and prevent cardiovascular and pulmonary stress.  We discussed the importance of weight loss and will try an aggressive weight loss strategy with Mounjaro.  At the same time we will initiate CPAP therapy.         Relevant Orders    Positive Airway Pressure (PAP) Therapy    Hypertensive disorder - Primary     Interval BP readings after stopping the norvasc are mostly at goal so we will continue off the norvasc         Class 3 severe obesity due to excess calories with body mass index (BMI) of 40.0 to 44.9 in adult (CMS/HCC)     This is a longstanding problem that is frustrating for this 52-year-old male who has failed many attempts at weight loss.  It is now severely contributing to his sleep apnea which is a serious underlying medical condition.  We discussed treatment options and he is an excellent candidate for Mounjaro.  He has been diagnosed with prediabetes as well which increases the benefit of Mounjaro.  We discussed the technique for titrating this medication up over the next 4 months and we  will put in a prescription for this today.         Relevant Medications    tirzepatide (Mounjaro) 2.5 mg/0.5 mL pen injector    Other Relevant Orders    Follow Up In [facility] - PCP

## 2023-05-12 NOTE — ASSESSMENT & PLAN NOTE
We reviewed the sleep study extensively showing severe obstructive sleep apnea with an AHI of almost 90 in the supine position.  We discussed the extreme benefit of treatment for sleep apnea to improve sleep, improve energy, and prevent cardiovascular and pulmonary stress.  We discussed the importance of weight loss and will try an aggressive weight loss strategy with Mounjaro.  At the same time we will initiate CPAP therapy.

## 2023-05-19 ENCOUNTER — TELEPHONE (OUTPATIENT)
Dept: PRIMARY CARE | Facility: CLINIC | Age: 53
End: 2023-05-19
Payer: COMMERCIAL

## 2023-05-19 DIAGNOSIS — R73.03 PREDIABETES: Primary | ICD-10-CM

## 2023-05-19 NOTE — TELEPHONE ENCOUNTER
Pt called, states his insurance company denied Mounjaro, but the pharmacist at [facility] in [place] told him to try Ozempic. He is asking if you could prescribe this for him and see if they will cover this instead? Please advise.

## 2023-10-19 ENCOUNTER — APPOINTMENT (OUTPATIENT)
Dept: PRIMARY CARE | Facility: CLINIC | Age: 53
End: 2023-10-19
Payer: COMMERCIAL

## 2023-10-23 ENCOUNTER — APPOINTMENT (OUTPATIENT)
Dept: PRIMARY CARE | Facility: CLINIC | Age: 53
End: 2023-10-23
Payer: COMMERCIAL

## 2024-03-07 DIAGNOSIS — I10 HYPERTENSION, UNSPECIFIED TYPE: ICD-10-CM

## 2024-03-08 RX ORDER — METOPROLOL SUCCINATE 100 MG/1
100 TABLET, EXTENDED RELEASE ORAL DAILY
Qty: 90 TABLET | Refills: 3 | Status: SHIPPED | OUTPATIENT
Start: 2024-03-08 | End: 2024-05-06 | Stop reason: SDUPTHER

## 2024-04-10 ENCOUNTER — OFFICE VISIT (OUTPATIENT)
Dept: PRIMARY CARE | Facility: CLINIC | Age: 54
End: 2024-04-10
Payer: COMMERCIAL

## 2024-04-10 VITALS
HEART RATE: 78 BPM | DIASTOLIC BLOOD PRESSURE: 79 MMHG | HEIGHT: 71 IN | SYSTOLIC BLOOD PRESSURE: 127 MMHG | BODY MASS INDEX: 40.04 KG/M2 | TEMPERATURE: 98.4 F | OXYGEN SATURATION: 96 % | RESPIRATION RATE: 16 BRPM | WEIGHT: 286 LBS

## 2024-04-10 DIAGNOSIS — J20.9 ACUTE BRONCHITIS, UNSPECIFIED ORGANISM: Primary | ICD-10-CM

## 2024-04-10 PROCEDURE — 3078F DIAST BP <80 MM HG: CPT | Performed by: FAMILY MEDICINE

## 2024-04-10 PROCEDURE — 1036F TOBACCO NON-USER: CPT | Performed by: FAMILY MEDICINE

## 2024-04-10 PROCEDURE — 3008F BODY MASS INDEX DOCD: CPT | Performed by: FAMILY MEDICINE

## 2024-04-10 PROCEDURE — 99213 OFFICE O/P EST LOW 20 MIN: CPT | Performed by: FAMILY MEDICINE

## 2024-04-10 PROCEDURE — 3074F SYST BP LT 130 MM HG: CPT | Performed by: FAMILY MEDICINE

## 2024-04-10 RX ORDER — AZITHROMYCIN 250 MG/1
TABLET, FILM COATED ORAL
Qty: 6 TABLET | Refills: 0 | Status: SHIPPED | OUTPATIENT
Start: 2024-04-10 | End: 2024-04-15

## 2024-04-10 ASSESSMENT — ENCOUNTER SYMPTOMS
RHINORRHEA: 1
COUGH: 1

## 2024-04-10 NOTE — PROGRESS NOTES
"Nory Verdugo \"Pavel\" is a 53 y.o. male who presents for URI.  URI   Episode onset: 9 days ago. The problem has been rapidly improving. The maximum temperature recorded prior to his arrival was 101 - 101.9 F. Associated symptoms include congestion, coughing and rhinorrhea. He has tried acetaminophen for the symptoms. The treatment provided mild relief.        Overall he is feeling significantly better today and it has been more than 24 hours since last fever.  He is denying any hypoxia and has only a mild dry cough right now    Visit Vitals  /79   Pulse 78   Temp 36.9 °C (98.4 °F)   Resp 16      Objective   Physical Exam  Constitutional:       Appearance: Normal appearance.   HENT:      Head: Normocephalic.      Right Ear: Tympanic membrane, ear canal and external ear normal.      Left Ear: Tympanic membrane, ear canal and external ear normal.      Nose: Nose normal.      Mouth/Throat:      Mouth: Mucous membranes are moist.   Eyes:      Conjunctiva/sclera: Conjunctivae normal.   Cardiovascular:      Rate and Rhythm: Normal rate and regular rhythm.   Pulmonary:      Effort: Pulmonary effort is normal.      Breath sounds: Wheezing and rhonchi present.   Skin:     General: Skin is warm.      Findings: No rash.   Neurological:      Mental Status: He is alert.   Psychiatric:         Mood and Affect: Mood normal.       Assessment/Plan      Problem List Items Addressed This Visit    None  Visit Diagnoses       Acute bronchitis, unspecified organism    -  Primary    Relevant Medications    azithromycin (Zithromax) 250 mg tablet        This 53-year-old male has a 3-day history of fever and mild cough.  Overall his symptoms are improving but his respiratory exam shows fairly significant rhonchi and some scattered wheezes in all 4 quadrants of his lungs.  I would like him to give this 24 hours to see if it truly is resolving but if not then would assume this is a bacterial bronchitis and send him in a " prescription for a macrolide antibiotic to cover atypicals

## 2024-05-06 DIAGNOSIS — N40.0 BENIGN PROSTATIC HYPERPLASIA, UNSPECIFIED WHETHER LOWER URINARY TRACT SYMPTOMS PRESENT: ICD-10-CM

## 2024-05-06 DIAGNOSIS — F41.9 ANXIETY DISORDER, UNSPECIFIED TYPE: ICD-10-CM

## 2024-05-06 DIAGNOSIS — R73.03 PREDIABETES: ICD-10-CM

## 2024-05-06 DIAGNOSIS — J45.30 MILD PERSISTENT ASTHMA, UNSPECIFIED WHETHER COMPLICATED (HHS-HCC): ICD-10-CM

## 2024-05-06 DIAGNOSIS — E78.5 HYPERLIPIDEMIA, UNSPECIFIED HYPERLIPIDEMIA TYPE: ICD-10-CM

## 2024-05-06 DIAGNOSIS — I10 HYPERTENSION, UNSPECIFIED TYPE: ICD-10-CM

## 2024-05-06 RX ORDER — MECLIZINE HYDROCHLORIDE 25 MG/1
25 TABLET ORAL 3 TIMES DAILY PRN
Qty: 30 TABLET | Refills: 3 | Status: SHIPPED
Start: 2024-05-06 | End: 2024-05-07 | Stop reason: SDUPTHER

## 2024-05-06 RX ORDER — ROSUVASTATIN CALCIUM 5 MG/1
5 TABLET, COATED ORAL DAILY
Qty: 90 TABLET | Refills: 3 | Status: SHIPPED | OUTPATIENT
Start: 2024-05-06

## 2024-05-06 RX ORDER — MONTELUKAST SODIUM 10 MG/1
10 TABLET ORAL NIGHTLY
Qty: 90 TABLET | Refills: 3 | Status: SHIPPED | OUTPATIENT
Start: 2024-05-06

## 2024-05-06 RX ORDER — TAMSULOSIN HYDROCHLORIDE 0.4 MG/1
0.4 CAPSULE ORAL DAILY
Qty: 90 CAPSULE | Refills: 3 | Status: SHIPPED | OUTPATIENT
Start: 2024-05-06

## 2024-05-06 RX ORDER — LISINOPRIL 40 MG/1
40 TABLET ORAL DAILY
Qty: 90 TABLET | Refills: 3 | Status: SHIPPED | OUTPATIENT
Start: 2024-05-06

## 2024-05-06 RX ORDER — AMLODIPINE BESYLATE 5 MG/1
5 TABLET ORAL DAILY
Qty: 90 TABLET | Refills: 3 | Status: SHIPPED | OUTPATIENT
Start: 2024-05-06

## 2024-05-06 RX ORDER — CITALOPRAM 20 MG/1
20 TABLET, FILM COATED ORAL DAILY
Qty: 90 TABLET | Refills: 3 | Status: SHIPPED | OUTPATIENT
Start: 2024-05-06

## 2024-05-06 RX ORDER — FUROSEMIDE 20 MG/1
20 TABLET ORAL DAILY
Qty: 90 TABLET | Refills: 3 | Status: SHIPPED | OUTPATIENT
Start: 2024-05-06

## 2024-05-06 RX ORDER — METOPROLOL SUCCINATE 100 MG/1
100 TABLET, EXTENDED RELEASE ORAL DAILY
Qty: 90 TABLET | Refills: 3 | Status: SHIPPED | OUTPATIENT
Start: 2024-05-06

## 2024-05-07 DIAGNOSIS — F41.9 ANXIETY DISORDER, UNSPECIFIED TYPE: ICD-10-CM

## 2024-05-07 NOTE — TELEPHONE ENCOUNTER
Pts insurance is requiring a 90 day supply for Meclizine. It is written for TID PRN with a qty of 30. The pharmacy is asking if this intended to last him 30 or 90 days, or should they dispense a different qty? Please advise.      Phone: [phone]  Ref # [id number]

## 2024-05-08 ENCOUNTER — TELEPHONE (OUTPATIENT)
Dept: PRIMARY CARE | Facility: CLINIC | Age: 54
End: 2024-05-08
Payer: COMMERCIAL

## 2024-05-08 RX ORDER — MECLIZINE HYDROCHLORIDE 25 MG/1
25 TABLET ORAL 3 TIMES DAILY PRN
Qty: 30 TABLET | Refills: 3 | Status: SHIPPED | OUTPATIENT
Start: 2024-05-08

## 2024-06-24 ENCOUNTER — APPOINTMENT (OUTPATIENT)
Dept: PRIMARY CARE | Facility: CLINIC | Age: 54
End: 2024-06-24
Payer: COMMERCIAL

## 2024-06-24 VITALS
TEMPERATURE: 97.7 F | BODY MASS INDEX: 41.44 KG/M2 | SYSTOLIC BLOOD PRESSURE: 119 MMHG | DIASTOLIC BLOOD PRESSURE: 77 MMHG | WEIGHT: 296 LBS | HEART RATE: 78 BPM | HEIGHT: 71 IN

## 2024-06-24 DIAGNOSIS — Z12.5 ENCOUNTER FOR SCREENING FOR MALIGNANT NEOPLASM OF PROSTATE: Primary | ICD-10-CM

## 2024-06-24 DIAGNOSIS — I10 HYPERTENSION, UNSPECIFIED TYPE: ICD-10-CM

## 2024-06-24 DIAGNOSIS — Z00.00 ROUTINE GENERAL MEDICAL EXAMINATION AT A HEALTH CARE FACILITY: ICD-10-CM

## 2024-06-24 DIAGNOSIS — I10 HTN (HYPERTENSION), BENIGN: ICD-10-CM

## 2024-06-24 DIAGNOSIS — R73.03 PREDIABETES: ICD-10-CM

## 2024-06-24 DIAGNOSIS — M17.12 PRIMARY OSTEOARTHRITIS OF LEFT KNEE: ICD-10-CM

## 2024-06-24 DIAGNOSIS — J45.20 MILD INTERMITTENT ASTHMA WITHOUT COMPLICATION (HHS-HCC): ICD-10-CM

## 2024-06-24 LAB — POC HEMOGLOBIN A1C: 5.5 % (ref 4.2–6.5)

## 2024-06-24 PROCEDURE — 83036 HEMOGLOBIN GLYCOSYLATED A1C: CPT | Performed by: FAMILY MEDICINE

## 2024-06-24 PROCEDURE — 99396 PREV VISIT EST AGE 40-64: CPT | Performed by: FAMILY MEDICINE

## 2024-06-24 PROCEDURE — 3074F SYST BP LT 130 MM HG: CPT | Performed by: FAMILY MEDICINE

## 2024-06-24 PROCEDURE — 3078F DIAST BP <80 MM HG: CPT | Performed by: FAMILY MEDICINE

## 2024-06-24 PROCEDURE — 1036F TOBACCO NON-USER: CPT | Performed by: FAMILY MEDICINE

## 2024-06-24 RX ORDER — FUROSEMIDE 20 MG/1
20 TABLET ORAL DAILY PRN
Qty: 90 TABLET | Refills: 3 | Status: SHIPPED | OUTPATIENT
Start: 2024-06-24

## 2024-06-24 NOTE — PATIENT INSTRUCTIONS
This light headed spells may be due to low BP.  Try taking 1/2 pill lisinopril 40 mg (this lowers to 20mg daily dose)  Check your BP daily for a week and let us know what the readings are

## 2024-06-24 NOTE — ASSESSMENT & PLAN NOTE
He is describing almost daily occurrence now of getting lightheaded and feeling his heart throbbing in his neck when he stands up quickly.  The sounds very much like hypotension and his systolic blood pressure is indeed lower than necessary for his treatment goal at 119 today.  Will go ahead and reduce the dose of lisinopril from 40 mg down to 20 mg but continue his current dose of amlodipine and metoprolol.  He will monitor his blood pressure daily and let me know what the numbers are in 1 week

## 2024-06-24 NOTE — PROGRESS NOTES
"Nory Verdugo \"Pavel\" is a 53 y.o. male who presents for a wellness visit.  HPI   Review of Systems  No results found.   Visit Vitals  /77   Pulse 78   Temp 36.5 °C (97.7 °F)      Objective   Physical Exam  Constitutional:       Appearance: Normal appearance.   HENT:      Head: Normocephalic.   Eyes:      Conjunctiva/sclera: Conjunctivae normal.   Cardiovascular:      Rate and Rhythm: Normal rate and regular rhythm.      Heart sounds: Normal heart sounds.   Pulmonary:      Effort: Pulmonary effort is normal.      Breath sounds: Normal breath sounds.   Musculoskeletal:      Cervical back: Neck supple.   Skin:     General: Skin is warm and dry.   Neurological:      Mental Status: He is alert.           Assessment/Plan    Problem List Items Addressed This Visit       HTN (hypertension), benign     He is describing almost daily occurrence now of getting lightheaded and feeling his heart throbbing in his neck when he stands up quickly.  The sounds very much like hypotension and his systolic blood pressure is indeed lower than necessary for his treatment goal at 119 today.  Will go ahead and reduce the dose of lisinopril from 40 mg down to 20 mg but continue his current dose of amlodipine and metoprolol.  He will monitor his blood pressure daily and let me know what the numbers are in 1 week         Relevant Medications    furosemide (Lasix) 20 mg tablet    Mild intermittent asthma without complication (HHS-HCC)    Osteoarthritis of knee     The knee arthritis is about the same but he continues to live an active lifestyle and continues to work.  He is now complaining of some lateral left hip pain that seems to come and go with a sharp nail like pain in his outer hip with certain movements.  Does not really sound like hip osteoarthritis but rather inflammation which may be secondary to the knee arthritis.  For the first step in treatment I do recommend over-the-counter NSAID like Tylenol and " stretching.         Prediabetes     A1c continues to show good control.  He will continue to work on diet and exercise         Relevant Orders    POCT glycosylated hemoglobin (Hb A1C) manually resulted (Completed)     Other Visit Diagnoses       Encounter for screening for malignant neoplasm of prostate    -  Primary    Relevant Orders    Prostate Specific Antigen    Routine general medical examination at a health care facility        Relevant Orders    CBC    Comprehensive Metabolic Panel    Lipid Panel    HIV 1/2 Antigen/Antibody Screen with Reflex to Confirmation        He had polyps seen on colonoscopy 3 years ago so we will have a repeat in 2 more years.  Since he has been treated for BPH she would like to go ahead and proceed with PSA screening as well.      Please excuse any errors in grammar or translation related to this dictation. Voice recognition software was utilized to prepare this document.

## 2024-06-24 NOTE — ASSESSMENT & PLAN NOTE
The knee arthritis is about the same but he continues to live an active lifestyle and continues to work.  He is now complaining of some lateral left hip pain that seems to come and go with a sharp nail like pain in his outer hip with certain movements.  Does not really sound like hip osteoarthritis but rather inflammation which may be secondary to the knee arthritis.  For the first step in treatment I do recommend over-the-counter NSAID like Tylenol and stretching.

## 2024-07-08 ENCOUNTER — LAB (OUTPATIENT)
Dept: LAB | Facility: LAB | Age: 54
End: 2024-07-08
Payer: COMMERCIAL

## 2024-07-08 DIAGNOSIS — Z00.00 ROUTINE GENERAL MEDICAL EXAMINATION AT A HEALTH CARE FACILITY: ICD-10-CM

## 2024-07-08 DIAGNOSIS — Z12.5 ENCOUNTER FOR SCREENING FOR MALIGNANT NEOPLASM OF PROSTATE: ICD-10-CM

## 2024-07-08 DIAGNOSIS — I10 HYPERTENSION, UNSPECIFIED TYPE: ICD-10-CM

## 2024-07-08 LAB
ALBUMIN SERPL BCP-MCNC: 4.4 G/DL (ref 3.4–5)
ALP SERPL-CCNC: 66 U/L (ref 33–120)
ALT SERPL W P-5'-P-CCNC: 32 U/L (ref 10–52)
ANION GAP SERPL CALC-SCNC: 14 MMOL/L (ref 10–20)
AST SERPL W P-5'-P-CCNC: 20 U/L (ref 9–39)
BILIRUB SERPL-MCNC: 0.5 MG/DL (ref 0–1.2)
BUN SERPL-MCNC: 14 MG/DL (ref 6–23)
CALCIUM SERPL-MCNC: 8.9 MG/DL (ref 8.6–10.3)
CHLORIDE SERPL-SCNC: 107 MMOL/L (ref 98–107)
CHOLEST SERPL-MCNC: 161 MG/DL (ref 0–199)
CHOLESTEROL/HDL RATIO: 3.7
CO2 SERPL-SCNC: 24 MMOL/L (ref 21–32)
CREAT SERPL-MCNC: 0.67 MG/DL (ref 0.5–1.3)
EGFRCR SERPLBLD CKD-EPI 2021: >90 ML/MIN/1.73M*2
ERYTHROCYTE [DISTWIDTH] IN BLOOD BY AUTOMATED COUNT: 12.2 % (ref 11.5–14.5)
GLUCOSE SERPL-MCNC: 126 MG/DL (ref 74–99)
HCT VFR BLD AUTO: 41.7 % (ref 41–52)
HDLC SERPL-MCNC: 43.1 MG/DL
HGB BLD-MCNC: 14.3 G/DL (ref 13.5–17.5)
HIV 1+2 AB+HIV1 P24 AG SERPL QL IA: NONREACTIVE
LDLC SERPL CALC-MCNC: 76 MG/DL
MCH RBC QN AUTO: 31.9 PG (ref 26–34)
MCHC RBC AUTO-ENTMCNC: 34.3 G/DL (ref 32–36)
MCV RBC AUTO: 93 FL (ref 80–100)
NON HDL CHOLESTEROL: 118 MG/DL (ref 0–149)
NRBC BLD-RTO: 0 /100 WBCS (ref 0–0)
PLATELET # BLD AUTO: 198 X10*3/UL (ref 150–450)
POTASSIUM SERPL-SCNC: 4.3 MMOL/L (ref 3.5–5.3)
PROT SERPL-MCNC: 6.5 G/DL (ref 6.4–8.2)
PSA SERPL-MCNC: 0.27 NG/ML
RBC # BLD AUTO: 4.48 X10*6/UL (ref 4.5–5.9)
SODIUM SERPL-SCNC: 141 MMOL/L (ref 136–145)
TRIGL SERPL-MCNC: 210 MG/DL (ref 0–149)
VLDL: 42 MG/DL (ref 0–40)
WBC # BLD AUTO: 6.3 X10*3/UL (ref 4.4–11.3)

## 2024-07-08 PROCEDURE — 84153 ASSAY OF PSA TOTAL: CPT

## 2024-07-08 PROCEDURE — 36415 COLL VENOUS BLD VENIPUNCTURE: CPT

## 2024-07-08 PROCEDURE — 80053 COMPREHEN METABOLIC PANEL: CPT

## 2024-07-08 PROCEDURE — 80061 LIPID PANEL: CPT

## 2024-07-08 PROCEDURE — 87389 HIV-1 AG W/HIV-1&-2 AB AG IA: CPT

## 2024-07-08 PROCEDURE — 85027 COMPLETE CBC AUTOMATED: CPT

## 2024-07-31 DIAGNOSIS — F41.9 ANXIETY DISORDER, UNSPECIFIED TYPE: ICD-10-CM

## 2024-07-31 DIAGNOSIS — K27.9 PUD (PEPTIC ULCER DISEASE): ICD-10-CM

## 2024-07-31 RX ORDER — HYDROGEN PEROXIDE 3 %
20 SOLUTION, NON-ORAL MISCELLANEOUS DAILY
Qty: 90 CAPSULE | Refills: 3 | Status: SHIPPED | OUTPATIENT
Start: 2024-07-31

## 2024-07-31 RX ORDER — TRAZODONE HYDROCHLORIDE 50 MG/1
50 TABLET ORAL DAILY
Qty: 90 TABLET | Refills: 3 | Status: SHIPPED | OUTPATIENT
Start: 2024-07-31

## 2024-12-27 ENCOUNTER — APPOINTMENT (OUTPATIENT)
Dept: PRIMARY CARE | Facility: CLINIC | Age: 54
End: 2024-12-27
Payer: COMMERCIAL

## 2025-03-25 ENCOUNTER — APPOINTMENT (OUTPATIENT)
Dept: CARDIOLOGY | Facility: CLINIC | Age: 55
End: 2025-03-25
Payer: COMMERCIAL

## 2025-03-25 VITALS
HEIGHT: 72 IN | HEART RATE: 74 BPM | DIASTOLIC BLOOD PRESSURE: 76 MMHG | BODY MASS INDEX: 40.5 KG/M2 | WEIGHT: 299 LBS | SYSTOLIC BLOOD PRESSURE: 138 MMHG

## 2025-03-25 DIAGNOSIS — R07.9 CHEST PAIN, UNSPECIFIED TYPE: ICD-10-CM

## 2025-03-25 DIAGNOSIS — R07.2 PRECORDIAL PAIN: Primary | ICD-10-CM

## 2025-03-25 DIAGNOSIS — R73.03 PREDIABETES: ICD-10-CM

## 2025-03-25 DIAGNOSIS — E78.2 MIXED HYPERLIPIDEMIA: ICD-10-CM

## 2025-03-25 DIAGNOSIS — G47.33 OBSTRUCTIVE SLEEP APNEA SYNDROME: ICD-10-CM

## 2025-03-25 DIAGNOSIS — I10 HTN (HYPERTENSION), BENIGN: ICD-10-CM

## 2025-03-25 PROCEDURE — 3008F BODY MASS INDEX DOCD: CPT | Performed by: INTERNAL MEDICINE

## 2025-03-25 PROCEDURE — 99204 OFFICE O/P NEW MOD 45 MIN: CPT | Performed by: INTERNAL MEDICINE

## 2025-03-25 PROCEDURE — 3075F SYST BP GE 130 - 139MM HG: CPT | Performed by: INTERNAL MEDICINE

## 2025-03-25 PROCEDURE — 1036F TOBACCO NON-USER: CPT | Performed by: INTERNAL MEDICINE

## 2025-03-25 PROCEDURE — 93000 ELECTROCARDIOGRAM COMPLETE: CPT | Performed by: INTERNAL MEDICINE

## 2025-03-25 PROCEDURE — 3078F DIAST BP <80 MM HG: CPT | Performed by: INTERNAL MEDICINE

## 2025-03-25 RX ORDER — HYDROCHLOROTHIAZIDE 12.5 MG/1
12.5 CAPSULE ORAL EVERY MORNING
Qty: 90 CAPSULE | Refills: 3 | Status: SHIPPED | OUTPATIENT
Start: 2025-03-25 | End: 2026-03-25

## 2025-03-25 NOTE — PROGRESS NOTES
CARDIOLOGY NEW PATIENT OFFICE VISIT    Patient:  Mook Verdugo  YOB: 1970  MRN: [id number]       Chief Complaint:      Chief Complaint   Patient presents with    New Patient Visit     Chest pain that comes and goes.       History of Present Illness:     Mook Verdugo is a 54 y.o. male who is being seen today at the request of Dr. Pranav Khan for evaluation of chest discomfort.  He has a longstanding history of intermittent chest discomfort.  Remote cardiac catheterization by Dr. Giron in May 2007 showed minimal coronary artery disease.  LV ejection fraction of 65%.  A stress echocardiogram October 1, 2019 did not show any significant abnormalities. He has a history of hypertension and hyperlipidemia.  He has prediabetes.  Most recent blood sugars have predominantly running in the 120s to 140s.  He stopped smoking tobacco in 2020.  He notes that his symptoms of exertional shortness of breath almost immediately improved.  He states that he is struggled to lose weight.  He notes that his father had congestive heart failure and atrial fibrillation.  He had a brother and sister who both had an aortic dissection.    He currently has been experiencing some varying types of intermittent chest discomfort.  He notes an aching pain in the left upper chest that radiates toward the left axilla.  He also notes some intermittent episodes of left shoulder discomfort.  More recently he has noted some mild pressure-like discomfort in the upper chest.  This week he was doing some work in his yard and noticed some worsening chest pressure with the increased exertion.  Total of the symptoms was approximately 5 to 10 minutes.  He denies any shortness of breath.  He denies any orthopnea, PND, or increasing peripheral edema.  He denies any palpitations, lightheadedness, near-syncope, or syncope.  He denies any fever, chills, or cough.  He denies any nausea, vomiting, or diaphoresis.  He denies any  hemoptysis, hematemesis, melena, or hematochezia.  EKG in the office today shows normal sinus rhythm and is a normal tracing.    We discussed diagnostic and treatment options.  In light of his symptoms of chest discomfort along with multiple cardiac risk factors he will be scheduled for coronary CT angiogram to exclude significant obstructive coronary artery disease.  Further recommendations pending these results.  Continue aspirin, amlodipine, Toprol-XL, rosuvastatin, and Lasix as needed.  He has been getting several blood pressure readings in the 130s and 140s systolic.  I advised him to start on hydrochlorothiazide 12.5 mg daily.      Allergies:     Allergies   Allergen Reactions    Cortisone Unknown     Tachycardia after an injection in the ankle        Outpatient Medications:     Current Outpatient Medications   Medication Instructions    acetaminophen (Tylenol) 500 mg tablet 2 tablets, 2 times daily    amLODIPine (NORVASC) 5 mg, oral, Daily    aspirin 81 mg EC tablet 1 tablet, Daily    citalopram (CELEXA) 20 mg, oral, Daily    esomeprazole (NEXIUM) 20 mg, oral, Daily    furosemide (LASIX) 20 mg, oral, Daily PRN    lisinopril 40 mg, oral, Daily    metoprolol succinate XL (TOPROL-XL) 100 mg, oral, Daily    montelukast (SINGULAIR) 10 mg, oral, Nightly    omega 3-dha-epa-fish oil 1,000 mg (120 mg-180 mg) capsule 1 capsule, Daily    OneTouch UltraSoft Lancets misc 1 Lancet, 3 times daily    OneTouch Verio test strips strip 1 strip, 3 times daily    rosuvastatin (CRESTOR) 5 mg, oral, Daily    tamsulosin (FLOMAX) 0.4 mg, oral, Daily    traZODone (DESYREL) 50 mg, oral, Daily        Past Medical History:     Past Medical History:   Diagnosis Date    HTN (hypertension)     Mixed hyperlipidemia        Social History:     Social History     Tobacco Use    Smoking status: Former     Current packs/day: 0.00     Types: Cigarettes     Quit date: 2020     Years since quittin.2    Smokeless tobacco: Never   Substance Use  Topics    Alcohol use: Yes     Comment: 1  beer a week    Drug use: Never       Family History:     Family History   Problem Relation Name Age of Onset    Cancer Mother      Cancer Father      Heart disease Father         Review of Systems:     12 point review of systems completed and is negative other than that detailed above.       Objective:     Vitals:    03/25/25 0858   BP: 138/76   Pulse: 74       Wt Readings from Last 4 Encounters:   03/25/25 136 kg (299 lb)   06/24/24 134 kg (296 lb)   04/10/24 130 kg (286 lb)   05/12/23 134 kg (295 lb)       Physical Examination:   GENERAL:  Well developed, well nourished, in no acute distress.  CHEST:  Symmetric and nontender.  NEURO/PSYCH:  Alert and oriented times three with approppriate behavior and responses.  NECK:  Supple, no JVD, no bruit.  LUNGS:  Clear to auscultation bilaterally, normal respiratory effort.  HEART:  Rate and rhythm regular with no evident murmur, no gallop appreciated.        There are no rubs, clicks or heaves.  EXTREMITIES:  Warm with good color, no clubbing or cyanosis.  There is no edema noted.  PERIPHERAL VASCULAR:  Pulses present and equally palpable; 2+ throughout.      Lab:     CBC:   Lab Results   Component Value Date    WBC 6.3 07/08/2024    RBC 4.48 (L) 07/08/2024    HGB 14.3 07/08/2024    HCT 41.7 07/08/2024     07/08/2024        CMP:    Lab Results   Component Value Date     07/08/2024    K 4.3 07/08/2024     07/08/2024    CO2 24 07/08/2024    BUN 14 07/08/2024    CREATININE 0.67 07/08/2024    GLUCOSE 126 (H) 07/08/2024    CALCIUM 8.9 07/08/2024       Lipid Profile:    Lab Results   Component Value Date    TRIG 210 (H) 07/08/2024    HDL 43.1 07/08/2024    LDLCALC 76 07/08/2024       BMP:  Lab Results   Component Value Date     07/08/2024     04/15/2022     05/10/2021     03/02/2021    K 4.3 07/08/2024    K 4.4 04/15/2022    K 4.1 05/10/2021    K 4.0 03/02/2021     07/08/2024      04/15/2022     05/10/2021     03/02/2021    CO2 24 07/08/2024    CO2 26 04/15/2022    CO2 25 05/10/2021    CO2 26 03/02/2021    BUN 14 07/08/2024    BUN 16 04/15/2022    BUN 12 05/10/2021    BUN 9 03/02/2021    CREATININE 0.67 07/08/2024    CREATININE 0.69 04/15/2022    CREATININE 0.74 05/10/2021    CREATININE 0.71 03/02/2021       CBC:  Lab Results   Component Value Date    WBC 6.3 07/08/2024    WBC 6.6 04/15/2022    WBC 7.5 05/04/2021    WBC 7.9 03/02/2021    RBC 4.48 (L) 07/08/2024    RBC 4.67 04/15/2022    RBC 4.50 05/04/2021    RBC 4.69 03/02/2021    HGB 14.3 07/08/2024    HGB 14.5 04/15/2022    HGB 14.4 05/04/2021    HGB 15.0 03/02/2021    HCT 41.7 07/08/2024    HCT 43.5 04/15/2022    HCT 43.0 05/04/2021    HCT 42.5 03/02/2021    MCV 93 07/08/2024    MCV 93 04/15/2022    MCV 96 05/04/2021    MCV 91 03/02/2021    MCH 31.9 07/08/2024    MCHC 34.3 07/08/2024    MCHC 33.3 04/15/2022    MCHC 33.5 05/04/2021    MCHC 35.3 03/02/2021    RDW 12.2 07/08/2024    RDW 11.9 04/15/2022    RDW 11.8 05/04/2021    RDW 12.0 03/02/2021     07/08/2024     04/15/2022     05/04/2021     03/02/2021       Hepatic Function Panel:    Lab Results   Component Value Date    ALKPHOS 66 07/08/2024    ALT 32 07/08/2024    AST 20 07/08/2024    PROT 6.5 07/08/2024    BILITOT 0.5 07/08/2024       PT/INR:    Lab Results   Component Value Date    PROTIME 11.5 05/04/2021    INR 1.0 05/04/2021       HgBA1c:    Lab Results   Component Value Date    HGBA1C 5.5 06/24/2024       Diagnostic Studies:     Electrocardiogram 12 Lead  Result Date: 2/13/2023    Normal sinus rhythm Incomplete right bundle branch block Minimal voltage criteria for LVH, may be normal variant Borderline ECG When compared with ECG of 24-SEP-2020 10:05, No significant change was found Confirmed by FIFI GOODSON MD ([id number]) on 3/5/2021 10:34:27 AM      XR CHEST 1 VIEW  Result Date: 3/2/2021    IMPRESSION: No acute cardiopulmonary  process.      Problem List:     Patient Active Problem List   Diagnosis    Anxiety disorder    Mild intermittent asthma without complication (HHS-HCC)    Obstructive sleep apnea syndrome    Benign prostatic hyperplasia    Diverticular disease    Hyperlipidemia    HTN (hypertension), benign    Irritable bowel syndrome    Male erectile disorder    Class 3 severe obesity due to excess calories with body mass index (BMI) of 40.0 to 44.9 in adult    Nonalcoholic fatty liver disease    Prediabetes    Osteoarthritis of knee    Peptic ulcer    Tendon nodule    Venous stasis    Seasonal allergies       Assessment:     Problem List Items Addressed This Visit             ICD-10-CM    Obstructive sleep apnea syndrome G47.33    Hyperlipidemia E78.5    HTN (hypertension), benign I10    Relevant Medications    hydroCHLOROthiazide (Microzide) 12.5 mg capsule    Prediabetes R73.03    Precordial pain - Primary R07.2     Other Visit Diagnoses         Codes    Chest pain, unspecified type     R07.9    Relevant Orders    ECG 12 lead (Clinic Performed)    CT angio coronary art with heartflow if score >30%            ISangita RN  am scribing for, and in the presence of Dr. Mc Arevalo MD, FACC.    I, Dr. Mc Arevalo MD, FACC, personally performed the services described in the documentation as scribed by Sangita John RN  in my presence, and confirm it is both accurate and complete.    Provider Attestation - Scribe documentation    The above portion of this note was dictated by me using voice recognition software.  All medical record entries made by the scribe were at my direction.  The scribe entering the documentation was in my presence.   I have reviewed the chart and agree that the record accurately reflects my personal performance of the history, physical exam, discussion and plan.

## 2025-03-25 NOTE — PATIENT INSTRUCTIONS
Continue same medications/treatment.  Patient educated on proper medication use.  Patient educated on risk factor modification.  Please bring any lab results from other providers/physicians to your next appointment.    Please bring all medicines, vitamins, and herbal supplements with you when you come to the office.    Prescriptions will not be filled unless you are compliant with your follow up appointments or have a follow up appointment scheduled as per instruction of your physician. Refills should be requested at the time of your visit.    Follow up after testing  CT angiogram to be done, take an EXTRA Toprol XL the a.m. of the procedure  START Hydrochlorothiazide 12.5 mg daily in the a.m.     I, BRANDIE JAIN RN, AM SCRIBING FOR AND IN THE PRESENCE OF DR. TOSHA GARCIA M.D., [facility]

## 2025-03-27 ENCOUNTER — TELEPHONE (OUTPATIENT)
Dept: CARDIOLOGY | Facility: CLINIC | Age: 55
End: 2025-03-27
Payer: COMMERCIAL

## 2025-03-27 DIAGNOSIS — I47.20 VENTRICULAR TACHYCARDIA (MULTI): ICD-10-CM

## 2025-03-27 NOTE — TELEPHONE ENCOUNTER
----- Message from Nurse Ledy CARDOSO sent at 3/27/2025  3:24 PM EDT -----  Regarding: pre CCTA lab and poss Lopressor dose  Hey girl,  When you get a chance, see if Dr. Arevalo would want Mook Verdugo to have a faster acting beta blocker instead of his Toprol on the morning prior to his CCTA.  Also, this silvano could use a new chemistry prior to the CT.  Thanks.

## 2025-03-28 DIAGNOSIS — R07.2 PRECORDIAL PAIN: Primary | ICD-10-CM

## 2025-03-28 DIAGNOSIS — I10 HTN (HYPERTENSION), BENIGN: ICD-10-CM

## 2025-03-28 LAB
ANION GAP SERPL CALCULATED.4IONS-SCNC: 10 MMOL/L (CALC) (ref 7–17)
BUN SERPL-MCNC: 19 MG/DL (ref 7–25)
BUN/CREAT SERPL: 30 (CALC) (ref 6–22)
CALCIUM SERPL-MCNC: 8.9 MG/DL (ref 8.6–10.3)
CHLORIDE SERPL-SCNC: 104 MMOL/L (ref 98–110)
CO2 SERPL-SCNC: 23 MMOL/L (ref 20–32)
CREAT SERPL-MCNC: 0.63 MG/DL (ref 0.7–1.3)
EGFRCR SERPLBLD CKD-EPI 2021: 112 ML/MIN/1.73M2
GLUCOSE SERPL-MCNC: 122 MG/DL (ref 65–99)
POTASSIUM SERPL-SCNC: 4.3 MMOL/L (ref 3.5–5.3)
SODIUM SERPL-SCNC: 137 MMOL/L (ref 135–146)

## 2025-03-31 ENCOUNTER — TELEPHONE (OUTPATIENT)
Dept: PRIMARY CARE | Facility: CLINIC | Age: 55
End: 2025-03-31
Payer: COMMERCIAL

## 2025-03-31 DIAGNOSIS — I10 HTN (HYPERTENSION), BENIGN: ICD-10-CM

## 2025-03-31 DIAGNOSIS — K27.9 PUD (PEPTIC ULCER DISEASE): ICD-10-CM

## 2025-03-31 DIAGNOSIS — J45.30 MILD PERSISTENT ASTHMA, UNSPECIFIED WHETHER COMPLICATED (HHS-HCC): ICD-10-CM

## 2025-03-31 DIAGNOSIS — N40.0 BENIGN PROSTATIC HYPERPLASIA, UNSPECIFIED WHETHER LOWER URINARY TRACT SYMPTOMS PRESENT: ICD-10-CM

## 2025-03-31 DIAGNOSIS — F41.9 ANXIETY DISORDER, UNSPECIFIED TYPE: ICD-10-CM

## 2025-03-31 DIAGNOSIS — I10 HYPERTENSION, UNSPECIFIED TYPE: ICD-10-CM

## 2025-03-31 DIAGNOSIS — E78.5 HYPERLIPIDEMIA, UNSPECIFIED HYPERLIPIDEMIA TYPE: ICD-10-CM

## 2025-03-31 RX ORDER — AMLODIPINE BESYLATE 5 MG/1
5 TABLET ORAL DAILY
Qty: 90 TABLET | Refills: 3 | Status: SHIPPED | OUTPATIENT
Start: 2025-03-31

## 2025-03-31 RX ORDER — TAMSULOSIN HYDROCHLORIDE 0.4 MG/1
0.4 CAPSULE ORAL DAILY
Qty: 90 CAPSULE | Refills: 3 | Status: SHIPPED | OUTPATIENT
Start: 2025-03-31

## 2025-03-31 RX ORDER — HYDROGEN PEROXIDE 3 %
20 SOLUTION, NON-ORAL MISCELLANEOUS DAILY
Qty: 90 CAPSULE | Refills: 3 | Status: SHIPPED | OUTPATIENT
Start: 2025-03-31

## 2025-03-31 RX ORDER — LISINOPRIL 40 MG/1
40 TABLET ORAL DAILY
Qty: 90 TABLET | Refills: 3 | Status: SHIPPED | OUTPATIENT
Start: 2025-03-31

## 2025-03-31 RX ORDER — MONTELUKAST SODIUM 10 MG/1
10 TABLET ORAL NIGHTLY
Qty: 90 TABLET | Refills: 3 | Status: SHIPPED | OUTPATIENT
Start: 2025-03-31

## 2025-03-31 RX ORDER — FUROSEMIDE 20 MG/1
20 TABLET ORAL DAILY PRN
Qty: 90 TABLET | Refills: 3 | Status: SHIPPED | OUTPATIENT
Start: 2025-03-31

## 2025-03-31 RX ORDER — TRAZODONE HYDROCHLORIDE 50 MG/1
50 TABLET ORAL DAILY
Qty: 90 TABLET | Refills: 3 | Status: SHIPPED | OUTPATIENT
Start: 2025-03-31

## 2025-03-31 RX ORDER — METOPROLOL SUCCINATE 100 MG/1
100 TABLET, EXTENDED RELEASE ORAL DAILY
Qty: 90 TABLET | Refills: 3 | Status: SHIPPED | OUTPATIENT
Start: 2025-03-31

## 2025-03-31 RX ORDER — CITALOPRAM 20 MG/1
20 TABLET, FILM COATED ORAL DAILY
Qty: 90 TABLET | Refills: 3 | Status: SHIPPED | OUTPATIENT
Start: 2025-03-31

## 2025-03-31 RX ORDER — ROSUVASTATIN CALCIUM 5 MG/1
5 TABLET, COATED ORAL DAILY
Qty: 90 TABLET | Refills: 3 | Status: SHIPPED | OUTPATIENT
Start: 2025-03-31

## 2025-03-31 RX ORDER — HYDROCHLOROTHIAZIDE 12.5 MG/1
12.5 CAPSULE ORAL EVERY MORNING
Qty: 90 CAPSULE | Refills: 3 | Status: SHIPPED | OUTPATIENT
Start: 2025-03-31 | End: 2026-03-31

## 2025-04-02 RX ORDER — ALBUTEROL SULFATE 90 UG/1
2 INHALANT RESPIRATORY (INHALATION) EVERY 4 HOURS PRN
COMMUNITY

## 2025-04-08 ENCOUNTER — HOSPITAL ENCOUNTER (OUTPATIENT)
Dept: RADIOLOGY | Facility: CLINIC | Age: 55
Discharge: HOME | End: 2025-04-08
Payer: COMMERCIAL

## 2025-04-08 VITALS
RESPIRATION RATE: 20 BRPM | HEART RATE: 74 BPM | DIASTOLIC BLOOD PRESSURE: 60 MMHG | OXYGEN SATURATION: 100 % | SYSTOLIC BLOOD PRESSURE: 128 MMHG

## 2025-04-08 DIAGNOSIS — R07.9 CHEST PAIN, UNSPECIFIED TYPE: ICD-10-CM

## 2025-04-08 PROCEDURE — 2550000001 HC RX 255 CONTRASTS: Performed by: INTERNAL MEDICINE

## 2025-04-08 PROCEDURE — 2500000001 HC RX 250 WO HCPCS SELF ADMINISTERED DRUGS (ALT 637 FOR MEDICARE OP): Performed by: INTERNAL MEDICINE

## 2025-04-08 PROCEDURE — 75574 CT ANGIO HRT W/3D IMAGE: CPT

## 2025-04-08 PROCEDURE — 2500000004 HC RX 250 GENERAL PHARMACY W/ HCPCS (ALT 636 FOR OP/ED): Performed by: INTERNAL MEDICINE

## 2025-04-08 RX ORDER — METOPROLOL TARTRATE 25 MG/1
100 TABLET, FILM COATED ORAL ONCE
Status: COMPLETED | OUTPATIENT
Start: 2025-04-08 | End: 2025-04-08

## 2025-04-08 RX ORDER — METOPROLOL TARTRATE 25 MG/1
50 TABLET, FILM COATED ORAL ONCE
Status: COMPLETED | OUTPATIENT
Start: 2025-04-08 | End: 2025-04-08

## 2025-04-08 RX ORDER — NITROGLYCERIN 400 UG/1
2 SPRAY ORAL ONCE
Status: DISCONTINUED | OUTPATIENT
Start: 2025-04-08 | End: 2025-04-09 | Stop reason: HOSPADM

## 2025-04-08 RX ORDER — METOPROLOL TARTRATE 1 MG/ML
10 INJECTION, SOLUTION INTRAVENOUS
Status: DISCONTINUED | OUTPATIENT
Start: 2025-04-08 | End: 2025-04-09 | Stop reason: HOSPADM

## 2025-04-08 RX ADMIN — METOPROLOL TARTRATE 100 MG: 50 TABLET, FILM COATED ORAL at 10:55

## 2025-04-08 RX ADMIN — METOPROLOL TARTRATE 5 MG: 5 INJECTION INTRAVENOUS at 12:43

## 2025-04-08 RX ADMIN — IOHEXOL 85 ML: 350 INJECTION, SOLUTION INTRAVENOUS at 13:53

## 2025-04-08 RX ADMIN — METOPROLOL TARTRATE 5 MG: 5 INJECTION INTRAVENOUS at 12:47

## 2025-04-08 RX ADMIN — METOPROLOL TARTRATE 50 MG: 50 TABLET, FILM COATED ORAL at 11:36

## 2025-04-08 RX ADMIN — METOPROLOL TARTRATE 5 MG: 5 INJECTION INTRAVENOUS at 12:36

## 2025-04-08 RX ADMIN — METOPROLOL TARTRATE 5 MG: 5 INJECTION INTRAVENOUS at 12:29

## 2025-04-11 ENCOUNTER — APPOINTMENT (OUTPATIENT)
Dept: PRIMARY CARE | Facility: CLINIC | Age: 55
End: 2025-04-11
Payer: COMMERCIAL

## 2025-04-11 ENCOUNTER — TELEPHONE (OUTPATIENT)
Dept: CARDIOLOGY | Facility: CLINIC | Age: 55
End: 2025-04-11

## 2025-04-11 VITALS
HEIGHT: 72 IN | TEMPERATURE: 97.9 F | WEIGHT: 298 LBS | HEART RATE: 72 BPM | BODY MASS INDEX: 40.36 KG/M2 | RESPIRATION RATE: 18 BRPM | DIASTOLIC BLOOD PRESSURE: 85 MMHG | SYSTOLIC BLOOD PRESSURE: 127 MMHG

## 2025-04-11 DIAGNOSIS — I25.10 CORONARY ARTERY DISEASE INVOLVING NATIVE CORONARY ARTERY OF NATIVE HEART WITHOUT ANGINA PECTORIS: ICD-10-CM

## 2025-04-11 DIAGNOSIS — E66.813 CLASS 3 SEVERE OBESITY DUE TO EXCESS CALORIES WITH SERIOUS COMORBIDITY AND BODY MASS INDEX (BMI) OF 40.0 TO 44.9 IN ADULT: ICD-10-CM

## 2025-04-11 DIAGNOSIS — I10 HTN (HYPERTENSION), BENIGN: ICD-10-CM

## 2025-04-11 DIAGNOSIS — R73.9 HYPERGLYCEMIA: ICD-10-CM

## 2025-04-11 DIAGNOSIS — E66.01 CLASS 3 SEVERE OBESITY DUE TO EXCESS CALORIES WITH SERIOUS COMORBIDITY AND BODY MASS INDEX (BMI) OF 40.0 TO 44.9 IN ADULT: ICD-10-CM

## 2025-04-11 DIAGNOSIS — K76.0 NONALCOHOLIC FATTY LIVER DISEASE: Primary | ICD-10-CM

## 2025-04-11 DIAGNOSIS — F17.210 SMOKING GREATER THAN 25 PACK YEARS: ICD-10-CM

## 2025-04-11 DIAGNOSIS — F17.210 CIGARETTE NICOTINE DEPENDENCE WITHOUT COMPLICATION: ICD-10-CM

## 2025-04-11 PROBLEM — R07.2 PRECORDIAL PAIN: Status: RESOLVED | Noted: 2025-03-25 | Resolved: 2025-04-11

## 2025-04-11 LAB — POC HEMOGLOBIN A1C: 6.2 % (ref 4.2–6.5)

## 2025-04-11 PROCEDURE — 1036F TOBACCO NON-USER: CPT | Performed by: FAMILY MEDICINE

## 2025-04-11 PROCEDURE — 83036 HEMOGLOBIN GLYCOSYLATED A1C: CPT | Performed by: FAMILY MEDICINE

## 2025-04-11 PROCEDURE — 3079F DIAST BP 80-89 MM HG: CPT | Performed by: FAMILY MEDICINE

## 2025-04-11 PROCEDURE — 3008F BODY MASS INDEX DOCD: CPT | Performed by: FAMILY MEDICINE

## 2025-04-11 PROCEDURE — 99214 OFFICE O/P EST MOD 30 MIN: CPT | Performed by: FAMILY MEDICINE

## 2025-04-11 PROCEDURE — 3074F SYST BP LT 130 MM HG: CPT | Performed by: FAMILY MEDICINE

## 2025-04-11 RX ORDER — TIRZEPATIDE 2.5 MG/.5ML
2.5 INJECTION, SOLUTION SUBCUTANEOUS
Qty: 2 ML | Refills: 0 | Status: SHIPPED | OUTPATIENT
Start: 2025-04-13

## 2025-04-11 NOTE — ASSESSMENT & PLAN NOTE
He is seeing cardiology and just had a chest CT angiography performed.  Fortunately there was no significant stenosis seen, we reviewed the results in detail today.  He continues to pursue maximum prevention with daily aspirin, statin therapy, and good blood pressure control.

## 2025-04-11 NOTE — TELEPHONE ENCOUNTER
----- Message from Jose Roberto Arevalo sent at 4/11/2025  4:10 PM EDT -----  Coronary CT angiogram reviewed and looks fine.  Atherosclerotic plaque with mild to moderate narrowings.  No severe heart artery narrowings that would require intervention such as angioplasty/stenting.  Continue same and follow-up as scheduled.  Thanks.

## 2025-04-11 NOTE — ASSESSMENT & PLAN NOTE
This 54-year-old male has struggled with obesity for many years now.  His current BMI is above 40 putting him at significant risk for weight related medical disease.  He has comorbidity conditions of coronary artery disease as well as fatty liver infiltration.  This makes the benefit of weight loss significantly more impactful for him.  He is interested in trying the GLP-1 therapy.  I explained to him the mechanism of action and side effects to consider including bloating and nausea.  I believe that the benefit by far outweighs any possible side effects so would like to go ahead and start on a treatment trial of Mounjaro.  We will consult with our pharmacy team to help with the prior authorization as well as the dose titration and follow-up in 3 months  Orders:    Referral to Clinical Pharmacy; Future    tirzepatide (Mounjaro) 2.5 mg/0.5 mL pen injector; Inject 2.5 mg under the skin 1 (one) time per week.    Follow Up In [facility] - PCP; Future

## 2025-04-11 NOTE — TELEPHONE ENCOUNTER
Called patient with results.  He was in agreement with with plan and verbalized understanding.  Stefanai Duarte, CMA     michaela calls to let us know that the insurance does cover Verio One Touch or Accucheck Guide. Their phone is .

## 2025-04-11 NOTE — PROGRESS NOTES
"Nory Verdugo \"Pavel\" is a 54 y.o. male who presents for Obesity.     HPI         He would like to discuss weight loss medications.    Visit Vitals  /85   Pulse 72   Temp 36.6 °C (97.9 °F)   Resp 18      Objective   Physical Exam  Constitutional:       Appearance: Normal appearance.   HENT:      Head: Normocephalic.   Eyes:      Conjunctiva/sclera: Conjunctivae normal.   Cardiovascular:      Rate and Rhythm: Normal rate and regular rhythm.      Heart sounds: Normal heart sounds.   Pulmonary:      Effort: Pulmonary effort is normal.      Breath sounds: Normal breath sounds.   Musculoskeletal:      Cervical back: Neck supple.   Skin:     General: Skin is warm and dry.   Neurological:      Mental Status: He is alert.       No data recorded     No data recorded   No data recorded   Assessment & Plan  Class 3 severe obesity due to excess calories with serious comorbidity and body mass index (BMI) of 40.0 to 44.9 in adult  This 54-year-old male has struggled with obesity for many years now.  His current BMI is above 40 putting him at significant risk for weight related medical disease.  He has comorbidity conditions of coronary artery disease as well as fatty liver infiltration.  This makes the benefit of weight loss significantly more impactful for him.  He is interested in trying the GLP-1 therapy.  I explained to him the mechanism of action and side effects to consider including bloating and nausea.  I believe that the benefit by far outweighs any possible side effects so would like to go ahead and start on a treatment trial of Mounjaro.  We will consult with our pharmacy team to help with the prior authorization as well as the dose titration and follow-up in 3 months  Orders:    Referral to Clinical Pharmacy; Future    tirzepatide (Mounjaro) 2.5 mg/0.5 mL pen injector; Inject 2.5 mg under the skin 1 (one) time per week.    Follow Up In [facility] - PCP; Future    Nonalcoholic fatty " liver disease         HTN (hypertension), benign         Cigarette nicotine dependence without complication    Orders:    CT lung screening low dose; Future    Smoking greater than 25 pack years    Orders:    CT lung screening low dose; Future    Coronary artery disease involving native coronary artery of native heart without angina pectoris  He is seeing cardiology and just had a chest CT angiography performed.  Fortunately there was no significant stenosis seen, we reviewed the results in detail today.  He continues to pursue maximum prevention with daily aspirin, statin therapy, and good blood pressure control.              Please excuse any errors in grammar or translation related to this dictation. Voice recognition software was utilized to prepare this document.

## 2025-04-15 ENCOUNTER — APPOINTMENT (OUTPATIENT)
Dept: CARDIOLOGY | Facility: CLINIC | Age: 55
End: 2025-04-15
Payer: COMMERCIAL

## 2025-04-18 ENCOUNTER — APPOINTMENT (OUTPATIENT)
Dept: CARDIOLOGY | Facility: CLINIC | Age: 55
End: 2025-04-18
Payer: COMMERCIAL

## 2025-04-18 VITALS
SYSTOLIC BLOOD PRESSURE: 120 MMHG | DIASTOLIC BLOOD PRESSURE: 64 MMHG | HEART RATE: 79 BPM | HEIGHT: 72 IN | WEIGHT: 294.2 LBS | BODY MASS INDEX: 39.85 KG/M2

## 2025-04-18 DIAGNOSIS — R73.03 PREDIABETES: ICD-10-CM

## 2025-04-18 DIAGNOSIS — I10 HTN (HYPERTENSION), BENIGN: ICD-10-CM

## 2025-04-18 DIAGNOSIS — E78.2 MIXED HYPERLIPIDEMIA: ICD-10-CM

## 2025-04-18 DIAGNOSIS — I25.10 CORONARY ARTERY DISEASE INVOLVING NATIVE CORONARY ARTERY OF NATIVE HEART WITHOUT ANGINA PECTORIS: Primary | ICD-10-CM

## 2025-04-18 PROCEDURE — 99214 OFFICE O/P EST MOD 30 MIN: CPT | Performed by: INTERNAL MEDICINE

## 2025-04-18 PROCEDURE — 3074F SYST BP LT 130 MM HG: CPT | Performed by: INTERNAL MEDICINE

## 2025-04-18 PROCEDURE — 3008F BODY MASS INDEX DOCD: CPT | Performed by: INTERNAL MEDICINE

## 2025-04-18 PROCEDURE — 3078F DIAST BP <80 MM HG: CPT | Performed by: INTERNAL MEDICINE

## 2025-04-18 PROCEDURE — 1036F TOBACCO NON-USER: CPT | Performed by: INTERNAL MEDICINE

## 2025-04-18 NOTE — PROGRESS NOTES
Patient:  Mook Verdugo  YOB: 1970  MRN: [id number]       Chief Complaint:      No chief complaint on file.      HPI:       Mook Verdugo is a 54 y.o. male who returns today for cardiac follow-up.  He was seen in consultation on March 25, 2025 for evaluation of chest discomfort.  He has a longstanding history of intermittent chest discomfort.  Remote cardiac catheterization by Dr. Giron in May 2007 showed minimal coronary artery disease.  LV ejection fraction of 65%.  A stress echocardiogram October 1, 2019 did not show any significant abnormalities. He has a history of hypertension and hyperlipidemia.  He has prediabetes.  Most recent blood sugars have predominantly running in the 120s to 140s.  He stopped smoking tobacco in 2020.  He notes that his symptoms of exertional shortness of breath almost immediately improved.  He states that he is struggled to lose weight.  He notes that his father had congestive heart failure and atrial fibrillation.  He had a brother and sister who both had an aortic dissection.     At the time of his recent visit he described some varying types of intermittent chest discomfort.  He noted an aching pain in the left upper chest that radiates toward the left axilla.  He also reported some intermittent episodes of left shoulder discomfort.  More recently he was experiencing some mild pressure-like discomfort in the upper chest.  He had some exertional related chest pressure.  Total duration of the pressure was approximately 5 to 10 minutes.  His EKG EKG showed normal sinus rhythm, normal tracing.    Coronary CT angiogram on April 8, 2025 showed mild to moderate diffuse coronary artery disease without any significant flow-limiting stenoses.  Coronary artery calcium score 671 (LOPEZ risk 97th percentile for age, gender, and race).     He currently is doing well.  He notes a few aches and pains in his chest but none of which have been prolonged.  Denies any  recurrent chest pressure or tightness sensations.  He denies any shortness of breath.  He denies any orthopnea, PND, or increasing peripheral edema.  He denies any palpitations, lightheadedness, near-syncope, or syncope.  He denies any fever, chills, or cough.  He denies any nausea, vomiting, or diaphoresis.  He denies any hemoptysis, hematemesis, melena, or hematochezia.  I discussed results of the CT angiogram with him.  No obstructive coronary disease.  He will continue on aspirin, amlodipine, Toprol-XL, rosuvastatin, and Lasix as needed.  His blood pressures have normalized since going on low-dose hydrochlorothiazide.  Basic metabolic profile on March 28, 2025 was normal with exception of glucose 122.  CBC on July 8, 2024 was normal.  Cholesterol 161 with HDL 43, LDL 76, triglycerides 210.  Other details as noted below.      Objective:     Vitals:    04/18/25 1423   BP: 120/64   Pulse: 79       Wt Readings from Last 4 Encounters:   04/11/25 135 kg (298 lb)   03/25/25 136 kg (299 lb)   06/24/24 134 kg (296 lb)   04/10/24 130 kg (286 lb)       Allergies:     Allergies[1]       Medications:     Current Outpatient Medications   Medication Instructions    acetaminophen (Tylenol) 500 mg tablet 2 tablets, 2 times daily    albuterol 90 mcg/actuation inhaler 2 puffs, Every 4 hours PRN    amLODIPine (NORVASC) 5 mg, oral, Daily    aspirin 81 mg EC tablet 1 tablet, Daily    citalopram (CELEXA) 20 mg, oral, Daily    esomeprazole (NEXIUM) 20 mg, oral, Daily    furosemide (LASIX) 20 mg, oral, Daily PRN    hydroCHLOROthiazide (MICROZIDE) 12.5 mg, oral, Every morning    lisinopril 40 mg, oral, Daily    metoprolol succinate XL (TOPROL-XL) 100 mg, oral, Daily    montelukast (SINGULAIR) 10 mg, oral, Nightly    Mounjaro 2.5 mg, subcutaneous, Once Weekly    omega 3-dha-epa-fish oil 1,000 mg (120 mg-180 mg) capsule 1 capsule, Daily    OneTouch UltraSoft Lancets misc 1 Lancet, 3 times daily    OneTouch Verio test strips strip 1 strip,  3 times daily    rosuvastatin (CRESTOR) 5 mg, oral, Daily    tamsulosin (FLOMAX) 0.4 mg, oral, Daily    traZODone (DESYREL) 50 mg, oral, Daily       Physical Examination:   GENERAL:  Well developed, well nourished, in no acute distress.  CHEST:  Symmetric and nontender.  NEURO/PSYCH:  Alert and oriented times three with approppriate behavior and responses.  NECK:  Supple, no JVD, no bruit.  LUNGS:  Clear to auscultation bilaterally, normal respiratory effort.  HEART:  Rate and rhythm regular with no evident murmur, no gallop appreciated.        There are no rubs, clicks or heaves.  EXTREMITIES:  Warm with good color, no clubbing or cyanosis.  There is no edema noted.  PERIPHERAL VASCULAR:  Pulses present and equally palpable; 2+ throughout.      Lab:     CBC:   Lab Results   Component Value Date    WBC 6.3 07/08/2024    RBC 4.48 (L) 07/08/2024    HGB 14.3 07/08/2024    HCT 41.7 07/08/2024     07/08/2024        CMP:    Lab Results   Component Value Date     03/28/2025    K 4.3 03/28/2025     03/28/2025    CO2 23 03/28/2025    BUN 19 03/28/2025    CREATININE 0.63 (L) 03/28/2025    GLUCOSE 122 (H) 03/28/2025    CALCIUM 8.9 03/28/2025       Lipid Profile:    Lab Results   Component Value Date    TRIG 210 (H) 07/08/2024    HDL 43.1 07/08/2024    LDLCALC 76 07/08/2024       BMP:  Lab Results   Component Value Date     03/28/2025     07/08/2024     04/15/2022     05/10/2021     03/02/2021    K 4.3 03/28/2025    K 4.3 07/08/2024    K 4.4 04/15/2022    K 4.1 05/10/2021    K 4.0 03/02/2021     03/28/2025     07/08/2024     04/15/2022     05/10/2021     03/02/2021    CO2 23 03/28/2025    CO2 24 07/08/2024    CO2 26 04/15/2022    CO2 25 05/10/2021    CO2 26 03/02/2021    BUN 19 03/28/2025    BUN 14 07/08/2024    BUN 16 04/15/2022    BUN 12 05/10/2021    BUN 9 03/02/2021    CREATININE 0.63 (L) 03/28/2025    CREATININE 0.67 07/08/2024    CREATININE  0.69 04/15/2022    CREATININE 0.74 05/10/2021    CREATININE 0.71 03/02/2021       CBC:  Lab Results   Component Value Date    WBC 6.3 07/08/2024    WBC 6.6 04/15/2022    WBC 7.5 05/04/2021    WBC 7.9 03/02/2021    RBC 4.48 (L) 07/08/2024    RBC 4.67 04/15/2022    RBC 4.50 05/04/2021    RBC 4.69 03/02/2021    HGB 14.3 07/08/2024    HGB 14.5 04/15/2022    HGB 14.4 05/04/2021    HGB 15.0 03/02/2021    HCT 41.7 07/08/2024    HCT 43.5 04/15/2022    HCT 43.0 05/04/2021    HCT 42.5 03/02/2021    MCV 93 07/08/2024    MCV 93 04/15/2022    MCV 96 05/04/2021    MCV 91 03/02/2021    MCH 31.9 07/08/2024    MCHC 34.3 07/08/2024    MCHC 33.3 04/15/2022    MCHC 33.5 05/04/2021    MCHC 35.3 03/02/2021    RDW 12.2 07/08/2024    RDW 11.9 04/15/2022    RDW 11.8 05/04/2021    RDW 12.0 03/02/2021     07/08/2024     04/15/2022     05/04/2021     03/02/2021       Hepatic Function Panel:    Lab Results   Component Value Date    ALKPHOS 66 07/08/2024    ALT 32 07/08/2024    AST 20 07/08/2024    PROT 6.5 07/08/2024    BILITOT 0.5 07/08/2024       HgBA1c:    Lab Results   Component Value Date    HGBA1C 6.2 04/11/2025       Diagnostic Studies:     CT angio coronary art with heartflow if score >30%  Addendum Date: 4/10/2025  Interpreted By:  Joe Rockwell, ADDENDUM: Technical: The following is to serve as an over-read for an unenhanced cardiac CT, to evaluate the extravascular structures.   Contiguous unenhanced CT sections are performed from the level of the david to the upper abdomen.       Findings: The visualized portions of both lungs are clear.   There is no sign of pathologic lymph node enlargement. There is no pericardial or pleural effusion.   Images through the upper abdomen are unremarkable.   The visualized osseous structures are intact.       Impression: The extravascular structures have an unremarkable CT appearance.   Signed by: Joe Rockwell 4/10/2025 7:43 PM       Result Date:  4/10/2025  Interpreted By:  Joe Goodman, STUDY: CT ANGIO CORONARY ART WITH HEARTFLOW IF SCORE >30%;  4/8/2025 1:15 pm   INDICATION: Signs/Symptoms:chest pain.   COMPARISON: None.   ACCESSION NUMBER(S): [id number]   ORDERING CLINICIAN: TOSHA GARCIA   TECHNIQUE: Using multi-detector CT technology, Annabella 64-slice scanner, axial, sequential imaging with retrospective gating was performed of the chest following the intravenous administration of contrast material. A low-osmolar contrast agent was used 75 ml of Omnipaque 350. Using prospective ECG gating, CT scan of the coronary arteries was performed without intravenous contrast. Coronary calcium scoring was performed according to the method of Agatston.   The patient was premedicated with atenolol and 0.4 mg sublingual nitroglycerin per protocol for heart rate control and coronary dilation, respectively.   For optimization of anatomic evaluation, multiplanar reconstruction, maximum intensity projections, and advanced 3-D off-line postprocessing were performed on a dedicated stand-alone workstation under the direct supervision of the interpreting physician.   CT Dose-Length Product (DLP):  1093.2 mGy/cm CT Dose Reduction Employed: Yes iterative reconstruction         1. Mild-moderate diffuse coronary artery disease without significant stenosis. 2. Coronary artery calcium score 671, 97th percentile for age, gender, and race in asymptomatic patients. 3. Technically difficult study due to motion artifact. Heart rate 69 beats per minute during image acquisition despite pre treatment with beta-blocker.   Reading Cardiologist: Dr. Joe Goodman, Date:  4/10/2025  6:45 pm   Signed by: Joe Goodman 4/10/2025 6:49 PM Dictation workstation:   [id number]    Asessment:     Problem List Items Addressed This Visit           ICD-10-CM    Hyperlipidemia E78.5    Relevant Orders    Lipid Panel    Lipid Panel    HTN (hypertension), benign I10    Relevant Orders    Follow  Up In Cardiology    Comprehensive Metabolic Panel    CBC    Prediabetes R73.03    Coronary artery disease involving native coronary artery of native heart without angina pectoris - Primary I25.10    Relevant Orders    Follow Up In Cardiology    Comprehensive Metabolic Panel    CBC     Stefania GOODEN CMA   am scribing for, and in the presence of Dr. Mc Arevalo MD, [facility].    I, Dr. Mc Arevalo MD, [facility], personally performed the services described in the documentation as scribed by Stefania Duarte CMA   in my presence, and confirm it is both accurate and complete.      Provider Attestation - Scribe documentation    The above portion of this note was dictated by me using voice recognition software.  All medical record entries made by the scribe were at my direction.  The scribe entering the documentation was in my presence.   I have reviewed the chart and agree that the record accurately reflects my personal performance of the history, physical exam, discussion and plan.            [1]   Allergies  Allergen Reactions    Cortisone Unknown     Tachycardia after an injection in the ankle

## 2025-04-18 NOTE — PATIENT INSTRUCTIONS
FASTING LABS, FASTING FROM MIDNIGHT THE NIGHT BEFORE TO BE DONE NEAR FUTURE WITHIN 1-2 WEEKS AND AGAIN  4-7 DAYS PRIOR TO YOUR APPOINTMENT WITH DR GARCIA IN 1 YEAR    TO SCHEDULE YOUR LAB APPOINTMENT CALL [facility] AT [phone]     Please bring all medicines, vitamins, and herbal supplements with you in original bottles to every appointment! This is the best way to ensure your medication list in your chart is accurate.    Prescriptions will not be filled unless you are compliant with your follow up appointments or have a follow up appointment scheduled as per instruction of your physician. Refills should be requested at the time of your visit.    DID YOU KNOW  We have a pharmacy here in the [facility].  They can fill all prescriptions, not just cardiac medications.  Prescriptions from other pharmacies can easily be transferred to the  pharmacy by the  pharmacist on site.   pharmacies offer FREE HOME DELIVERY on medications to anywhere in [place]. They can sync your medications. Typically prescriptions can be ready in 10 - 15 minutes. If pharmacy is unable to fill your  prescription or if cost is more than your paying now the Pharmacist can easily transfer back to your Pharmacy of choice. Pharmacy phone # [phone].

## 2025-04-25 ENCOUNTER — HOSPITAL ENCOUNTER (OUTPATIENT)
Dept: RADIOLOGY | Facility: CLINIC | Age: 55
End: 2025-04-25
Payer: COMMERCIAL

## 2025-04-29 ENCOUNTER — APPOINTMENT (OUTPATIENT)
Dept: CARDIOLOGY | Facility: CLINIC | Age: 55
End: 2025-04-29
Payer: COMMERCIAL

## 2025-04-30 ENCOUNTER — APPOINTMENT (OUTPATIENT)
Dept: PHARMACY | Facility: HOSPITAL | Age: 55
End: 2025-04-30
Payer: COMMERCIAL

## 2025-04-30 DIAGNOSIS — E66.813 CLASS 3 SEVERE OBESITY DUE TO EXCESS CALORIES WITH SERIOUS COMORBIDITY AND BODY MASS INDEX (BMI) OF 40.0 TO 44.9 IN ADULT: ICD-10-CM

## 2025-04-30 RX ORDER — TIRZEPATIDE 2.5 MG/.5ML
2.5 INJECTION, SOLUTION SUBCUTANEOUS
Qty: 2 ML | Refills: 0 | Status: SHIPPED | OUTPATIENT
Start: 2025-05-04 | End: 2025-04-30 | Stop reason: ALTCHOICE

## 2025-04-30 NOTE — PROGRESS NOTES
"Subjective   Patient ID: Mook Verdugo \"Pavel\" is a 54 y.o. male who presents for Follow-up.      Patient reports struggled with obesity for several years. His current BMI is above 30 putting him at significant risk for weight related medical disease. Comorbid conditions: AIDE, CAD, fatty liver infiltration, prediabetes, This makes the benefit of weight loss significantly more impactful for him. He is interested in trying the GLP-1 therapy.     Patient reports having severe AIDE with CPAP after a sleep study  -AIDE with AHI at almost 90 at supine position-patient aware of benefits of treatment with CPAP and weight loss to prevent further pulmonary and cardiovascular stress.     Assessment/Plan   Problem List Items Addressed This Visit       Class 3 severe obesity due to excess calories with serious comorbidity and body mass index (BMI) of 40.0 to 44.9 in adult       LAB RESULTS:  Lab Results   Component Value Date    HGBA1C 6.2 04/11/2025    HGBA1C 5.5 06/24/2024    HGBA1C 7.1 03/02/2021     Lab Results   Component Value Date    LDLCALC 76 07/08/2024    CREATININE 0.63 (L) 03/28/2025      Lab Results   Component Value Date    CHOL 161 07/08/2024    CHOL 145 03/02/2021    CHOL 161 02/11/2019     Lab Results   Component Value Date    HDL 43.1 07/08/2024    HDL 44.0 03/02/2021    HDL 36.8 (A) 02/11/2019       Lab Results   Component Value Date    LDLCALC 76 07/08/2024     Lab Results   Component Value Date    TRIG 210 (H) 07/08/2024    TRIG 191 (H) 03/02/2021    TRIG 392 (H) 02/11/2019             Continue all meds under the continuation of care with the referring provider and clinical pharmacy team.    Severe AIDE with cpap, Obesity with BMI 30    Start zepbound 2.5 mg weekly - sent to [facility] for pick-up and PA team to work on PA    1-week determination follow-up and potential cost assistance. May 8th - 9:40  Start Zepbound 2.5 mg weekly    "

## 2025-05-01 ENCOUNTER — ANCILLARY PROCEDURE (OUTPATIENT)
Facility: HOSPITAL | Age: 55
End: 2025-05-01
Payer: COMMERCIAL

## 2025-05-01 DIAGNOSIS — F17.210 CIGARETTE NICOTINE DEPENDENCE WITHOUT COMPLICATION: ICD-10-CM

## 2025-05-01 DIAGNOSIS — F17.210 SMOKING GREATER THAN 25 PACK YEARS: ICD-10-CM

## 2025-05-01 PROCEDURE — 71271 CT THORAX LUNG CANCER SCR C-: CPT | Performed by: RADIOLOGY

## 2025-05-01 PROCEDURE — 71271 CT THORAX LUNG CANCER SCR C-: CPT

## 2025-05-08 ENCOUNTER — APPOINTMENT (OUTPATIENT)
Dept: PHARMACY | Facility: HOSPITAL | Age: 55
End: 2025-05-08
Payer: COMMERCIAL

## 2025-05-08 DIAGNOSIS — E66.813 CLASS 3 SEVERE OBESITY DUE TO EXCESS CALORIES WITH SERIOUS COMORBIDITY AND BODY MASS INDEX (BMI) OF 40.0 TO 44.9 IN ADULT: ICD-10-CM

## 2025-05-08 RX ORDER — SEMAGLUTIDE 0.25 MG/.5ML
0.25 INJECTION, SOLUTION SUBCUTANEOUS WEEKLY
Qty: 2 ML | Refills: 1 | Status: SHIPPED | OUTPATIENT
Start: 2025-05-08 | End: 2025-06-05

## 2025-05-22 ENCOUNTER — APPOINTMENT (OUTPATIENT)
Dept: PHARMACY | Facility: HOSPITAL | Age: 55
End: 2025-05-22
Payer: COMMERCIAL

## 2025-05-22 DIAGNOSIS — E66.813 CLASS 3 SEVERE OBESITY DUE TO EXCESS CALORIES WITH SERIOUS COMORBIDITY AND BODY MASS INDEX (BMI) OF 40.0 TO 44.9 IN ADULT: ICD-10-CM

## 2025-05-22 NOTE — PROGRESS NOTES
"Subjective   Patient ID: Mook Verdugo \"Pavel\" is a 54 y.o. male who presents for No chief complaint on file..      [facility] PA denied - Plan exlusion; Zepbound denied at previous [facility] for same reason      Assessment/Plan   Problem List Items Addressed This Visit    None      LAB RESULTS:  Lab Results   Component Value Date    HGBA1C 6.2 04/11/2025    HGBA1C 5.5 06/24/2024    HGBA1C 7.1 03/02/2021     Lab Results   Component Value Date    LDLCALC 76 07/08/2024    CREATININE 0.63 (L) 03/28/2025      Lab Results   Component Value Date    CHOL 161 07/08/2024    CHOL 145 03/02/2021    CHOL 161 02/11/2019     Lab Results   Component Value Date    HDL 43.1 07/08/2024    HDL 44.0 03/02/2021    HDL 36.8 (A) 02/11/2019       Lab Results   Component Value Date    LDLCALC 76 07/08/2024     Lab Results   Component Value Date    TRIG 210 (H) 07/08/2024    TRIG 191 (H) 03/02/2021    TRIG 392 (H) 02/11/2019       Continue all meds under the continuation of care with the referring provider and clinical pharmacy team.    Follow-up with PCP at normal scheduled visit. Follow-up PRN patient need to recheck for obesity agents.     "

## 2025-07-14 ENCOUNTER — APPOINTMENT (OUTPATIENT)
Dept: PRIMARY CARE | Facility: CLINIC | Age: 55
End: 2025-07-14
Payer: COMMERCIAL

## 2025-07-22 DIAGNOSIS — J45.20 MILD INTERMITTENT ASTHMA WITHOUT COMPLICATION (HHS-HCC): ICD-10-CM

## 2025-07-22 RX ORDER — ALBUTEROL SULFATE 90 UG/1
2 INHALANT RESPIRATORY (INHALATION) EVERY 4 HOURS PRN
Qty: 6.7 G | Refills: 11 | Status: SHIPPED | OUTPATIENT
Start: 2025-07-22

## 2025-08-13 ENCOUNTER — OFFICE VISIT (OUTPATIENT)
Dept: ORTHOPEDIC SURGERY | Facility: CLINIC | Age: 55
End: 2025-08-13
Payer: COMMERCIAL

## 2025-08-13 ENCOUNTER — HOSPITAL ENCOUNTER (OUTPATIENT)
Dept: RADIOLOGY | Facility: CLINIC | Age: 55
Discharge: HOME | End: 2025-08-13
Payer: COMMERCIAL

## 2025-08-13 DIAGNOSIS — M79.605 LEFT LEG PAIN: ICD-10-CM

## 2025-08-13 DIAGNOSIS — S86.112A GASTROCNEMIUS MUSCLE STRAIN, LEFT, INITIAL ENCOUNTER: Primary | ICD-10-CM

## 2025-08-13 PROCEDURE — 73590 X-RAY EXAM OF LOWER LEG: CPT | Mod: LT

## 2025-08-13 PROCEDURE — L4361 PNEUMA/VAC WALK BOOT PRE OTS: HCPCS | Performed by: INTERNAL MEDICINE

## 2025-08-13 PROCEDURE — 73590 X-RAY EXAM OF LOWER LEG: CPT | Mod: LEFT SIDE | Performed by: INTERNAL MEDICINE

## 2025-08-13 PROCEDURE — 99203 OFFICE O/P NEW LOW 30 MIN: CPT | Performed by: INTERNAL MEDICINE

## 2025-08-13 PROCEDURE — 99212 OFFICE O/P EST SF 10 MIN: CPT | Performed by: INTERNAL MEDICINE

## 2025-08-13 ASSESSMENT — PATIENT HEALTH QUESTIONNAIRE - PHQ9
2. FEELING DOWN, DEPRESSED OR HOPELESS: NOT AT ALL
1. LITTLE INTEREST OR PLEASURE IN DOING THINGS: NOT AT ALL
SUM OF ALL RESPONSES TO PHQ9 QUESTIONS 1 AND 2: 0

## 2025-09-09 ENCOUNTER — APPOINTMENT (OUTPATIENT)
Dept: ORTHOPEDIC SURGERY | Facility: CLINIC | Age: 55
End: 2025-09-09
Payer: COMMERCIAL

## 2026-04-17 ENCOUNTER — APPOINTMENT (OUTPATIENT)
Dept: CARDIOLOGY | Facility: CLINIC | Age: 56
End: 2026-04-17
Payer: COMMERCIAL